# Patient Record
Sex: FEMALE | Race: WHITE | Employment: OTHER | URBAN - METROPOLITAN AREA
[De-identification: names, ages, dates, MRNs, and addresses within clinical notes are randomized per-mention and may not be internally consistent; named-entity substitution may affect disease eponyms.]

---

## 2017-10-25 ENCOUNTER — OP HISTORICAL/CONVERTED ENCOUNTER (OUTPATIENT)
Dept: OTHER | Age: 72
End: 2017-10-25

## 2017-12-11 ENCOUNTER — OP HISTORICAL/CONVERTED ENCOUNTER (OUTPATIENT)
Dept: OTHER | Age: 72
End: 2017-12-11

## 2020-08-19 ENCOUNTER — ED HISTORICAL/CONVERTED ENCOUNTER (OUTPATIENT)
Dept: OTHER | Age: 75
End: 2020-08-19

## 2020-08-24 ENCOUNTER — TELEPHONE (OUTPATIENT)
Dept: PRIMARY CARE CLINIC | Age: 75
End: 2020-08-24

## 2020-08-25 NOTE — TELEPHONE ENCOUNTER
I denied it because she have refill at the [facility]. Ivania Víctor filled it on 07/01/2020 90day supply with 1 refill.

## 2020-10-13 ENCOUNTER — TELEPHONE (OUTPATIENT)
Dept: PRIMARY CARE CLINIC | Age: 75
End: 2020-10-13

## 2020-10-13 NOTE — TELEPHONE ENCOUNTER
Patient called stating the pharmacy is holding her medication til tomorrow, she wants to talk to Judi.

## 2020-10-14 ENCOUNTER — TELEPHONE (OUTPATIENT)
Dept: PRIMARY CARE CLINIC | Age: 75
End: 2020-10-14

## 2020-10-15 RX ORDER — LOSARTAN POTASSIUM AND HYDROCHLOROTHIAZIDE 12.5; 1 MG/1; MG/1
TABLET ORAL
Qty: 30 TAB | Refills: 0 | Status: SHIPPED | OUTPATIENT
Start: 2020-10-15 | End: 2021-03-16 | Stop reason: SDUPTHER

## 2020-10-15 NOTE — TELEPHONE ENCOUNTER
I called pt house # but its the wrong # then I call the cell and it didn't do anything. I will call pt back.

## 2020-10-15 NOTE — TELEPHONE ENCOUNTER
OK. I have called the pt her home phone is not the right number and her cell phone is not doing anything.  LE

## 2021-03-16 DIAGNOSIS — G25.81 RESTLESS LEG: ICD-10-CM

## 2021-03-16 DIAGNOSIS — E55.9 VITAMIN D DEFICIENCY: ICD-10-CM

## 2021-03-16 DIAGNOSIS — E78.2 MIXED HYPERLIPIDEMIA: ICD-10-CM

## 2021-03-16 DIAGNOSIS — I10 ESSENTIAL HYPERTENSION: Primary | ICD-10-CM

## 2021-03-16 DIAGNOSIS — K21.9 GERD WITHOUT ESOPHAGITIS: ICD-10-CM

## 2021-03-16 RX ORDER — EZETIMIBE 10 MG/1
10 TABLET ORAL DAILY
Qty: 30 TAB | Refills: 0 | Status: SHIPPED | OUTPATIENT
Start: 2021-03-16 | End: 2021-04-27 | Stop reason: SDUPTHER

## 2021-03-16 RX ORDER — PHENOL/SODIUM PHENOLATE
20 AEROSOL, SPRAY (ML) MUCOUS MEMBRANE DAILY
Qty: 30 TAB | Refills: 0 | Status: SHIPPED | OUTPATIENT
Start: 2021-03-16 | End: 2021-04-27 | Stop reason: SDUPTHER

## 2021-03-16 RX ORDER — MELATONIN
1000 DAILY
Qty: 30 TAB | Refills: 0 | Status: SHIPPED | OUTPATIENT
Start: 2021-03-16 | End: 2021-04-27 | Stop reason: SDUPTHER

## 2021-03-16 RX ORDER — ROPINIROLE 2 MG/1
2 TABLET, FILM COATED ORAL
Qty: 30 TAB | Refills: 0 | Status: SHIPPED | OUTPATIENT
Start: 2021-03-16 | End: 2021-12-02 | Stop reason: SDUPTHER

## 2021-03-16 RX ORDER — LOSARTAN POTASSIUM AND HYDROCHLOROTHIAZIDE 12.5; 1 MG/1; MG/1
1 TABLET ORAL DAILY
Qty: 30 TAB | Refills: 0 | Status: SHIPPED | OUTPATIENT
Start: 2021-03-16 | End: 2021-04-27 | Stop reason: SDUPTHER

## 2021-03-16 NOTE — PROGRESS NOTES
Here with her son today for his appointment. Sending in 30 days of her medication, agrees to make an appointment in next 30 days for medication refill as it has been over 6 months since her last visit.

## 2021-04-10 DIAGNOSIS — E78.2 MIXED HYPERLIPIDEMIA: ICD-10-CM

## 2021-04-10 DIAGNOSIS — G25.81 RESTLESS LEG: ICD-10-CM

## 2021-04-10 DIAGNOSIS — K21.9 GERD WITHOUT ESOPHAGITIS: ICD-10-CM

## 2021-04-10 DIAGNOSIS — E55.9 VITAMIN D DEFICIENCY: ICD-10-CM

## 2021-04-11 RX ORDER — OMEPRAZOLE 20 MG/1
CAPSULE, DELAYED RELEASE ORAL
Qty: 30 CAP | OUTPATIENT
Start: 2021-04-11

## 2021-04-11 RX ORDER — ROPINIROLE 2 MG/1
TABLET, FILM COATED ORAL
Qty: 30 TAB | Refills: 0 | OUTPATIENT
Start: 2021-04-11

## 2021-04-11 RX ORDER — EZETIMIBE 10 MG/1
TABLET ORAL
Qty: 30 TAB | Refills: 0 | OUTPATIENT
Start: 2021-04-11

## 2021-04-11 RX ORDER — MELATONIN
Qty: 30 TAB | Refills: 0 | OUTPATIENT
Start: 2021-04-11

## 2021-04-27 ENCOUNTER — OFFICE VISIT (OUTPATIENT)
Dept: PRIMARY CARE CLINIC | Age: 76
End: 2021-04-27
Payer: MEDICARE

## 2021-04-27 VITALS
SYSTOLIC BLOOD PRESSURE: 120 MMHG | DIASTOLIC BLOOD PRESSURE: 75 MMHG | OXYGEN SATURATION: 97 % | WEIGHT: 156.4 LBS | RESPIRATION RATE: 18 BRPM | HEART RATE: 78 BPM | BODY MASS INDEX: 28.78 KG/M2 | HEIGHT: 62 IN | TEMPERATURE: 98.2 F

## 2021-04-27 DIAGNOSIS — S82.892G CLOSED FRACTURE OF LEFT ANKLE WITH DELAYED HEALING, SUBSEQUENT ENCOUNTER: ICD-10-CM

## 2021-04-27 DIAGNOSIS — E55.9 VITAMIN D DEFICIENCY: ICD-10-CM

## 2021-04-27 DIAGNOSIS — R42 VERTIGO: ICD-10-CM

## 2021-04-27 DIAGNOSIS — Z91.81 AT HIGH RISK FOR FALLS: ICD-10-CM

## 2021-04-27 DIAGNOSIS — Z11.59 NEED FOR HEPATITIS C SCREENING TEST: ICD-10-CM

## 2021-04-27 DIAGNOSIS — M15.9 PRIMARY OSTEOARTHRITIS INVOLVING MULTIPLE JOINTS: ICD-10-CM

## 2021-04-27 DIAGNOSIS — Z78.0 POSTMENOPAUSAL STATE: ICD-10-CM

## 2021-04-27 DIAGNOSIS — E78.2 MIXED HYPERLIPIDEMIA: ICD-10-CM

## 2021-04-27 DIAGNOSIS — I10 ESSENTIAL HYPERTENSION: ICD-10-CM

## 2021-04-27 DIAGNOSIS — Z12.31 ENCOUNTER FOR SCREENING MAMMOGRAM FOR MALIGNANT NEOPLASM OF BREAST: ICD-10-CM

## 2021-04-27 DIAGNOSIS — K21.9 GERD WITHOUT ESOPHAGITIS: ICD-10-CM

## 2021-04-27 DIAGNOSIS — Z71.89 ACP (ADVANCE CARE PLANNING): ICD-10-CM

## 2021-04-27 DIAGNOSIS — Z00.00 MEDICARE ANNUAL WELLNESS VISIT, SUBSEQUENT: Primary | ICD-10-CM

## 2021-04-27 PROCEDURE — 1101F PT FALLS ASSESS-DOCD LE1/YR: CPT | Performed by: NURSE PRACTITIONER

## 2021-04-27 PROCEDURE — G8752 SYS BP LESS 140: HCPCS | Performed by: NURSE PRACTITIONER

## 2021-04-27 PROCEDURE — G8400 PT W/DXA NO RESULTS DOC: HCPCS | Performed by: NURSE PRACTITIONER

## 2021-04-27 PROCEDURE — G8419 CALC BMI OUT NRM PARAM NOF/U: HCPCS | Performed by: NURSE PRACTITIONER

## 2021-04-27 PROCEDURE — 3017F COLORECTAL CA SCREEN DOC REV: CPT | Performed by: NURSE PRACTITIONER

## 2021-04-27 PROCEDURE — G0439 PPPS, SUBSEQ VISIT: HCPCS | Performed by: NURSE PRACTITIONER

## 2021-04-27 PROCEDURE — G8536 NO DOC ELDER MAL SCRN: HCPCS | Performed by: NURSE PRACTITIONER

## 2021-04-27 PROCEDURE — G8754 DIAS BP LESS 90: HCPCS | Performed by: NURSE PRACTITIONER

## 2021-04-27 PROCEDURE — 1090F PRES/ABSN URINE INCON ASSESS: CPT | Performed by: NURSE PRACTITIONER

## 2021-04-27 PROCEDURE — 99214 OFFICE O/P EST MOD 30 MIN: CPT | Performed by: NURSE PRACTITIONER

## 2021-04-27 PROCEDURE — G8427 DOCREV CUR MEDS BY ELIG CLIN: HCPCS | Performed by: NURSE PRACTITIONER

## 2021-04-27 PROCEDURE — G8432 DEP SCR NOT DOC, RNG: HCPCS | Performed by: NURSE PRACTITIONER

## 2021-04-27 PROCEDURE — G9899 SCRN MAM PERF RSLTS DOC: HCPCS | Performed by: NURSE PRACTITIONER

## 2021-04-27 RX ORDER — PHENOL/SODIUM PHENOLATE
20 AEROSOL, SPRAY (ML) MUCOUS MEMBRANE DAILY
Qty: 90 TAB | Refills: 1 | Status: SHIPPED | OUTPATIENT
Start: 2021-04-27 | End: 2021-12-02 | Stop reason: SDUPTHER

## 2021-04-27 RX ORDER — LOSARTAN POTASSIUM AND HYDROCHLOROTHIAZIDE 12.5; 1 MG/1; MG/1
1 TABLET ORAL DAILY
Qty: 90 TAB | Refills: 1 | Status: SHIPPED | OUTPATIENT
Start: 2021-04-27 | End: 2021-12-02 | Stop reason: SDUPTHER

## 2021-04-27 RX ORDER — ESTRADIOL 0.1 MG/G
CREAM VAGINAL
COMMUNITY
Start: 2021-03-15 | End: 2021-12-02 | Stop reason: SDUPTHER

## 2021-04-27 RX ORDER — MELOXICAM 7.5 MG/1
7.5 TABLET ORAL DAILY
Qty: 90 TAB | Refills: 1 | Status: SHIPPED | OUTPATIENT
Start: 2021-04-27 | End: 2022-05-23

## 2021-04-27 RX ORDER — EZETIMIBE 10 MG/1
10 TABLET ORAL DAILY
Qty: 90 TAB | Refills: 1 | Status: SHIPPED | OUTPATIENT
Start: 2021-04-27 | End: 2021-12-02 | Stop reason: SDUPTHER

## 2021-04-27 RX ORDER — MECLIZINE HYDROCHLORIDE 25 MG/1
25 TABLET ORAL
Qty: 30 TAB | Refills: 1 | Status: SHIPPED | OUTPATIENT
Start: 2021-04-27 | End: 2021-05-24

## 2021-04-27 RX ORDER — MELATONIN
1000 DAILY
Qty: 90 TAB | Refills: 1 | Status: SHIPPED | OUTPATIENT
Start: 2021-04-27 | End: 2021-12-02 | Stop reason: SDUPTHER

## 2021-04-27 NOTE — PATIENT INSTRUCTIONS
Medicare Wellness Visit, Female     The best way to live healthy is to have a lifestyle where you eat a well-balanced diet, exercise regularly, limit alcohol use, and quit all forms of tobacco/nicotine, if applicable. Regular preventive services are another way to keep healthy. Preventive services (vaccines, screening tests, monitoring & exams) can help personalize your care plan, which helps you manage your own care. Screening tests can find health problems at the earliest stages, when they are easiest to treat. [facility] follows the current, evidence-based guidelines published by the [facility] Spann ([facility]) when recommending preventive services for our patients. Because we follow these guidelines, sometimes recommendations change over time as research supports it. (For example, mammograms used to be recommended annually. Even though Medicare will still pay for an annual mammogram, the newer guidelines recommend a mammogram every two years for women of average risk). Of course, you and your doctor may decide to screen more often for some diseases, based on your risk and your co-morbidities (chronic disease you are already diagnosed with). Preventive services for you include:  - Medicare offers their members a free annual wellness visit, which is time for you and your primary care provider to discuss and plan for your preventive service needs. Take advantage of this benefit every year!  -All adults over the age of 72 should receive the recommended pneumonia vaccines. Current USPSTF guidelines recommend a series of two vaccines for the best pneumonia protection.   -All adults should have a flu vaccine yearly and a tetanus vaccine every 10 years.   -All adults age 48 and older should receive the shingles vaccines (series of two vaccines).       -All adults age 38-68 who are overweight should have a diabetes screening test once every three years.   -All adults born between [birth year] and 1964 should be screened once for Hepatitis C.  -Other screening tests and preventive services for persons with diabetes include: an eye exam to screen for diabetic retinopathy, a kidney function test, a foot exam, and stricter control over your cholesterol.   -Cardiovascular screening for adults with routine risk involves an electrocardiogram (ECG) at intervals determined by your doctor.   -Colorectal cancer screenings should be done for adults age 54-65 with no increased risk factors for colorectal cancer. There are a number of acceptable methods of screening for this type of cancer. Each test has its own benefits and drawbacks. Discuss with your doctor what is most appropriate for you during your annual wellness visit. The different tests include: colonoscopy (considered the best screening method), a fecal occult blood test, a fecal DNA test, and sigmoidoscopy.    -A bone mass density test is recommended when a woman turns 65 to screen for osteoporosis. This test is only recommended one time, as a screening. Some providers will use this same test as a disease monitoring tool if you already have osteoporosis. -Breast cancer screenings are recommended every other year for women of normal risk, age 54-69.  -Cervical cancer screenings for women over age 72 are only recommended with certain risk factors.      Here is a list of your current Health Maintenance items (your personalized list of preventive services) with a due date:  Health Maintenance Due   Topic Date Due    Hepatitis C Test  Never done    DTaP/Tdap/Td  (1 - Tdap) Never done    Cholesterol Test   Never done    Shingles Vaccine (1 of 2) Never done    Colorectal Screening  Never done    Mammogram  Never done    Bone Mineral Density   Never done              Preventing Falls: Care Instructions  Your Care Instructions    Getting around your home safely can be a challenge if you have injuries or health problems that make it easy for you to fall. Loose rugs and furniture in walkways are among the dangers for many older people who have problems walking or who have poor eyesight. People who have conditions such as arthritis, osteoporosis, or dementia also have to be careful not to fall. You can make your home safer with a few simple measures. Follow-up care is a key part of your treatment and safety. Be sure to make and go to all appointments, and call your doctor if you are having problems. It's also a good idea to know your test results and keep a list of the medicines you take. How can you care for yourself at home? Taking care of yourself  · You may get dizzy if you do not drink enough water. To prevent dehydration, drink plenty of fluids, enough so that your urine is light yellow or clear like water. Choose water and other caffeine-free clear liquids. If you have kidney, heart, or liver disease and have to limit fluids, talk with your doctor before you increase the amount of fluids you drink. · Exercise regularly to improve your strength, muscle tone, and balance. Walk if you can. Swimming may be a good choice if you cannot walk easily. · Have your vision and hearing checked each year or any time you notice a change. If you have trouble seeing and hearing, you might not be able to avoid objects and could lose your balance. · Know the side effects of the medicines you take. Ask your doctor or pharmacist whether the medicines you take can affect your balance. Sleeping pills or sedatives can affect your balance. · Limit the amount of alcohol you drink. Alcohol can impair your balance and other senses. · Ask your doctor whether calluses or corns on your feet need to be removed. If you wear loose-fitting shoes because of calluses or corns, you can lose your balance and fall. · Talk to your doctor if you have numbness in your feet. Preventing falls at home  · Remove raised doorway thresholds, throw rugs, and clutter.  Repair loose carpet or raised areas in the floor. · Move furniture and electrical cords to keep them out of walking paths. · Use nonskid floor wax, and wipe up spills right away, especially on ceramic tile floors. · If you use a walker or cane, put rubber tips on it. If you use crutches, clean the bottoms of them regularly with an abrasive pad, such as steel wool. · Keep your house well lit, especially Rufina Nika, and outside walkways. Use night-lights in areas such as hallways and bathrooms. Add extra light switches or use remote switches (such as switches that go on or off when you clap your hands) to make it easier to turn lights on if you have to get up during the night. · Install sturdy handrails on stairways. · Move items in your cabinets so that the things you use a lot are on the lower shelves (about waist level). · Keep a cordless phone and a flashlight with new batteries by your bed. If possible, put a phone in each of the main rooms of your house, or carry a cell phone in case you fall and cannot reach a phone. Or, you can wear a device around your neck or wrist. You push a button that sends a signal for help. · Wear low-heeled shoes that fit well and give your feet good support. Use footwear with nonskid soles. Check the heels and soles of your shoes for wear. Repair or replace worn heels or soles. · Do not wear socks without shoes on wood floors. · Walk on the grass when the sidewalks are slippery. If you live in an area that gets snow and ice in the winter, sprinkle salt on slippery steps and sidewalks. Preventing falls in the bath  · Install grab bars and nonskid mats inside and outside your shower or tub and near the toilet and sinks. · Use shower chairs and bath benches. · Use a hand-held shower head that will allow you to sit while showering.   · Get into a tub or shower by putting the weaker leg in first. Get out of a tub or shower with your strong side first.  · Repair loose toilet seats and consider installing a raised toilet seat to make getting on and off the toilet easier. · Keep your bathroom door unlocked while you are in the shower. Where can you learn more? Go to http://www.[employer]aran[employer].com/. Enter [phone] [id number] [phone] in the search box to learn more about \"Preventing Falls: Care Instructions. \"  Current as of: March 16, 2018  Content Version: 11.8  © 7474-4027 [employer]. Care instructions adapted under license by [employer] (which disclaims liability or warranty for this information). If you have questions about a medical condition or this instruction, always ask your healthcare professional. [address] any warranty or liability for your use of this information.

## 2021-04-27 NOTE — PROGRESS NOTES
1. Have you been to the ER, urgent care clinic since your last visit? Hospitalized since your last visit? Yes pt broke her ankle    2. Have you seen or consulted any other health care providers outside of the [facility] since your last visit? Include any pap smears or colon screening. No  Visit Vitals  /75 (BP 1 Location: Right arm, BP Patient Position: At rest, BP Cuff Size: Adult)   Pulse 78   Temp 98.2 °F (36.8 °C) (Temporal)   Resp 18   Ht 5' 2\" (1.575 m)   Wt 156 lb 6.4 oz (70.9 kg)   SpO2 97%   BMI 28.61 kg/m²     Chief Complaint   Patient presents with    Follow Up Chronic Condition     Pt is here for chronic office visit.  Pt is asking for refills on vitamin d, zetia, omeprazole, ropinirole, losartan-htcz

## 2021-04-27 NOTE — PROGRESS NOTES
Gregorio Garcia is a 77 y.o. female presents for Medicare wellness visit. This is a Subsequent Medicare Annual Wellness Visit (AWV), (Performed more than 12 months after effective date of Medicare Part B enrollment and 12 months after last preventive visit.)    I have reviewed the patient's medical history in detail and updated the computerized patient record. History obtained from: the patient. female  77 y.o. WHITE  Depression Risk Factor Screening:     3 most recent PHQ Screens 4/27/2021   Little interest or pleasure in doing things Not at all   Feeling down, depressed, irritable, or hopeless Not at all   Total Score PHQ 2 0          Fall Risk Factor Screening:     Fall Risk Assessment, last 12 mths 4/27/2021   Able to walk? Yes   Fall in past 12 months? 1   Do you feel unsteady? 1   Are you worried about falling 1   Is the gait abnormal? 1   Number of falls in past 12 months 1   Fall with injury? 1       Alcohol Risk Screen    Do you average more than 1 drink per night or more than 7 drinks a week:  No    On any one occasion in the past three months have you have had more than 3 drinks containing alcohol:  No         Functional Ability and Level of Safety:    Hearing: Hearing is good. Activities of Daily Living: The home contains: no safety equipment. Patient does total self care      Ambulation: with no difficulty      Abuse Screen:  Patient is not abused         History and Pertinent Exam   Patient is due for chronic medical conditions and/or has acute concerns in addition to the medicare wellness visit and agrees to do both, understanding there may be a copay for the additional services provided. Other Concerns today:     1. Hypertension: Patients hypertension is well controlled on regimen of losartan- HCTZ. Denies headaches, blurred vision or dizziness. 2. Hyperlipidemia: Mixed hyperlipidemia well controlled on ezetimibe.     3.Acid Reflux: Patients GERD has been well controlled on prilosec. 4. Restless leg syndrome: she uses requip at night for her restless legs with good control over her symptoms. Only uses PRN as restless legs has calmed down. Does not need a refill today. 5. Vitamin D deficiency: She has been using daily vitamin D supplement. 6. OA: Primarily noted in shoulder, knees and back. Uses celebrex currently but would like to try something else. Patient notes that she has been having some dizziness when position changes. Feels like thinks are spinning. This comes and goes. Denies chest pain or palpitations. Health & Diet:   Please describe your diet habits: Regular   Do you get 5 servings of fruits or vegetables daily? yes  Do you exercise regularly? no    Review of Systems   Constitutional: Negative for malaise/fatigue and weight loss. HENT: Negative. Eyes: Negative for blurred vision, double vision and pain. Respiratory: Negative for cough and shortness of breath. Cardiovascular: Negative for chest pain, palpitations, orthopnea and leg swelling. Gastrointestinal: Negative for constipation, heartburn and nausea. Genitourinary: Negative. Musculoskeletal: Positive for falls and joint pain. Negative for myalgias. Skin: Negative for itching and rash. Neurological: Positive for dizziness. Negative for tingling, loss of consciousness, weakness and headaches. Endo/Heme/Allergies: Does not bruise/bleed easily. Psychiatric/Behavioral: Negative for depression. The patient is not nervous/anxious and does not have insomnia. Reviewed PmHx, FmHx, SocHx as well as meds and allergies, updated and dated in the chart.     Patient Active Problem List   Diagnosis Code    Essential hypertension I10    Mixed hyperlipidemia E78.2    Vitamin D deficiency E55.9    GERD without esophagitis K21.9    Restless leg G25.81    At high risk for falls Z91.81     Past Medical History:   Diagnosis Date    Diabetes ([facility] [address]     Hypercholesterolemia     Hypertension       Past Surgical History:   Procedure Laterality Date    HX ORTHOPAEDIC  09/2018    Hip repair      No Known Allergies  Current Outpatient Medications   Medication Sig Dispense Refill    estradioL (ESTRACE) 0.01 % (0.1 mg/gram) vaginal cream insert 1 gram vaginally three times a week      meloxicam (MOBIC) 7.5 mg tablet Take 1 Tab by mouth daily. 90 Tab 1    Omeprazole delayed release (PRILOSEC D/R) 20 mg tablet Take 1 Tab by mouth daily. Indications: gastroesophageal reflux disease 90 Tab 1    losartan-hydroCHLOROthiazide (HYZAAR) 100-12.5 mg per tablet Take 1 Tab by mouth daily. 90 Tab 1    ezetimibe (ZETIA) 10 mg tablet Take 1 Tab by mouth daily. 90 Tab 1    cholecalciferol (VITAMIN D3) (1000 Units /25 mcg) tablet Take 1 Tab by mouth daily. Indications: low vitamin D levels 90 Tab 1    meclizine (ANTIVERT) 25 mg tablet Take 1 Tab by mouth three (3) times daily as needed for Dizziness for up to 10 days. 30 Tab 1    rOPINIRole (REQUIP) 2 mg tablet Take 1 Tab by mouth nightly. 27 Tab 0     Family History   Problem Relation Age of Onset    Diabetes Other     Heart Disease Other     Lung Cancer Other      Social History     Tobacco Use    Smoking status: Never Smoker    Smokeless tobacco: Never Used   Substance Use Topics    Alcohol use: Yes     Comment: occasional          BP Readings from Last 3 Encounters:   04/27/21 120/75      Wt Readings from Last 3 Encounters:   04/27/21 156 lb 6.4 oz (70.9 kg)     Body mass index is 28.61 kg/m². No exam data present  Physical Exam  Constitutional:       Appearance: Normal appearance. HENT:      Head: Normocephalic and atraumatic. Right Ear: Tympanic membrane, ear canal and external ear normal.      Left Ear: Tympanic membrane, ear canal and external ear normal.      Nose: Nose normal.      Mouth/Throat:      Mouth: Mucous membranes are moist.      Pharynx: Oropharynx is clear.    Eyes:      Extraocular Movements: Extraocular movements intact. Right eye: Nystagmus present. Left eye: Nystagmus present. Conjunctiva/sclera: Conjunctivae normal.      Pupils: Pupils are equal, round, and reactive to light. Neck:      Musculoskeletal: Normal range of motion and neck supple. Cardiovascular:      Rate and Rhythm: Normal rate and regular rhythm. Pulses: Normal pulses. Heart sounds: Normal heart sounds. Pulmonary:      Effort: Pulmonary effort is normal.      Breath sounds: Normal breath sounds. Abdominal:      General: Abdomen is flat. Bowel sounds are normal.      Palpations: Abdomen is soft. Musculoskeletal: Normal range of motion. Left ankle: She exhibits swelling. Tenderness. Right lower leg: No edema. Left lower leg: No edema. Skin:     General: Skin is warm and dry. Capillary Refill: Capillary refill takes less than 2 seconds. Neurological:      General: No focal deficit present. Mental Status: She is alert and oriented to person, place, and time. Psychiatric:         Mood and Affect: Mood normal.         Thought Content: Thought content normal.         Judgment: Judgment normal.       Was the patient's timed Up & Go test unsteady or longer than 30 seconds? no    Evaluation of Cognitive Function   Mood/affect:  neutral, happy  Orientation: Person, Place, Time and Situation  Appearance: age appropriate  Family member/caregiver input: None  Clock Test and Mini Cog: Normal   Specialists/Care Team   Ashish Liang has established care with the following healthcare providers:    OBGYN: Dr. Case Officer for routine care    Ortho: Broke ankle in August 2020, following up with ortho.      PREVENTIVE CARE -SCREENINGS      Health Maintenance Topics with due status: Overdue       Topic Date Due    Hepatitis C Screening Never done    DTaP/Tdap/Td series Never done    Lipid Screen Never done    Shingrix Vaccine Age 50> Never done    Colorectal Cancer Screening Combo Never done    Breast Cancer Screen Mammogram Never done    Bone Densitometry (Dexa) Screening Never done     Health Maintenance Topics with due status: Not Due       Topic Last Completion Date    Medicare Yearly Exam 04/27/2021    Flu Vaccine Not Due     Health Maintenance Topics with due status: Completed       Topic Last Completion Date    Pneumococcal 65+ years 10/17/2014    COVID-19 Vaccine 04/21/2021         Colon cancer:  Recommendation: Colonoscopy every 10y or annual FIT test from 50-75 or every 3 year stool DNA based test with consideration of ongoing screening from 76-85. and Up to date or Completed    Lung cancer (LDCT): Recommendation: Yearly LDCT for pts 55-77 w 30-pack year hx and currently smoke or quit <15 yr ago. and Not Indicated    Hepatitis C:  Recommendation: One time screening for all patient's aged 18-79. and Due, ordered    Diabetes:   Recommendation: USPSTF recommends screening ages 38-67 y/o if overweight or obese. Medicare covers screening in those patients who are overweight, obese, have HTN or dyslipiemia, a personal history of gestational diabetes or prior elevated blood sugar, a family hx of DM, or any patient over age 72 and Up to date or Completed    Lipids:   Recommendation: screening for hyperlipidemia every 5 years after age 39 and Due, ordered        PREVENTIVE CARE - FEMALE SCREENINGS     Cervical cancer: Recommendation: Every 3 yr from 21-29 and every 5 yr from 33-67, with Pap and HPV testing. and Not Indicated    Breast cancer: Recommendation:  USPSTF recommends screening mammography every 2 yr from 54-69. The decision to start screening mammography in women prior to age 48 years should be an individual one. Women who place a higher value on the potential benefit than the potential harms may choose to begin biennial screening between the ages of 36 and 52 years.  Medicare covers annual screening mammography, USPSTF also recommends women with a personal or family history of breast, ovarian, tubal, or peritoneal cancer or who have an ancestry ([address]) associated with breast cancer susceptibility 1 and 2 (BRCA1/2) gene mutations with an appropriate brief familial risk assessment tool. Women with a positive result on the risk assessment tool should receive genetic counseling and, if indicated after counseling, genetic testing and mammogram is due, ordered    Osteoporosis: Recommendation: Screen all women at 72, earlier if elevated risk and Due, ordered    AAA:   Recommendation: One-time screening if family history of AAA and Not Indicated      IMMUNIZATIONS     Immunization History   Administered Date(s) Administered    COVID-19, PFIZER, MRNA, LNP-S, PF, 30MCG/0.3ML DOSE 03/30/2021, 04/21/2021    Pneumococcal Conjugate (PCV-13) 01/14/2014    Pneumococcal Polysaccharide (PPSV-23) 10/17/2014       Pneumovax:   Recommendation: PPSV23 once for all >65 and high risk <65  and Up to date or Completed    Prevnar:   Recommendation: PCV13 only if >65 and immunocompromised or residing in a nursing home, or in areas of low childhood Pneumococcal vaccination and Up to date or Completed    Influenza:   Recommendation: Vaccination annually, high dose if 65 or older and Up to date or Completed    Shingrix:  Recommendation: Vaccination 2 shots 2-6 months apart for all age >47 and Up to date or Completed    TDaP:    Recommendation: Vaccination Booster with TDaP every 10 yr. Discussion of Advance Directive   Discussed with Funmi Bardales. Lex Anil her ability to prepare and advance directive in the case that an injury or illness causes her to be unable to make health care decisions. Date of ACP Conversation: 04/27/21  Persons included in Conversation:  patient  Length of ACP Conversation in minutes:  <16 minutes (Non-Billable)    Authorized Decision Maker (if patient is incapable of making informed decisions):    This person is:   Named in Advance Directive or Healthcare Power of             For Patients with Decision Making Capacity:   Values/Goals: Exploration of values, goals, and preferences if recovery is not expected, even with continued medical treatment in the event of:  Imminent death  Severe, permanent brain injury    Conversation Outcomes / Follow-Up Plan:   ACP incomplete - refer to ACP Clinical Specialist    Assessment/Plan   V70.0,     Diagnoses and all orders for this visit:    1. Medicare annual wellness visit, subsequent    2. ACP (advance care planning)  -     REFERRAL TO ACP CLINICAL SPECIALIST    3. Vitamin D deficiency  Comments:  check vitamin D level. Orders:  -     cholecalciferol (VITAMIN D3) (1000 Units /25 mcg) tablet; Take 1 Tab by mouth daily. Indications: low vitamin D levels  -     VITAMIN D, 25 HYDROXY    4. Primary osteoarthritis involving multiple joints  Comments:  switch from celebrex to meloxicam.     Orders:  -     meloxicam (MOBIC) 7.5 mg tablet; Take 1 Tab by mouth daily. 5. GERD without esophagitis  -     Omeprazole delayed release (PRILOSEC D/R) 20 mg tablet; Take 1 Tab by mouth daily. Indications: gastroesophageal reflux disease    6. Essential hypertension  Comments:  well controlled on current medication  Orders:  -     losartan-hydroCHLOROthiazide (HYZAAR) 100-12.5 mg per tablet; Take 1 Tab by mouth daily.  -     METABOLIC PANEL, COMPREHENSIVE  -     CBC WITH AUTOMATED DIFF    7. Mixed hyperlipidemia  Comments:  stable on zetia  Orders:  -     ezetimibe (ZETIA) 10 mg tablet; Take 1 Tab by mouth daily.  -     LIPID PANEL    8. Encounter for screening mammogram for malignant neoplasm of breast  -     LAZARA MAMMO BI SCREENING INCL CAD; Future    9. Postmenopausal state  -     DEXA BONE DENSITY STUDY AXIAL; Future    10. Need for hepatitis C screening test  -     HEPATITIS C AB    11. Vertigo  Comments:  start on meclizine. Stay hydrated. Get up and change positions slowly to avoid falls. Orders:  -     meclizine (ANTIVERT) 25 mg tablet;  Take 1 Tab by mouth three (3) times daily as needed for Dizziness for up to 10 days. 12. At high risk for falls  Comments:  Discussed slower position movements to help prevent falls. Watch for lose rugs/objects in floor. 13. Closed fracture of left ankle with delayed healing, subsequent encounter  Comments:  continue with pain from ankle fracture in August 2020. Plans to call and follow up with ortho at [facility] in [facility].                Anamaria Odonnell NP

## 2021-04-28 ENCOUNTER — TELEPHONE (OUTPATIENT)
Dept: CASE MANAGEMENT | Age: 76
End: 2021-04-28

## 2021-04-28 LAB
25(OH)D3+25(OH)D2 SERPL-MCNC: 24.5 NG/ML (ref 30–100)
ALBUMIN SERPL-MCNC: 4.2 G/DL (ref 3.7–4.7)
ALBUMIN/GLOB SERPL: 1.7 {RATIO} (ref 1.2–2.2)
ALP SERPL-CCNC: 156 IU/L (ref 39–117)
ALT SERPL-CCNC: 34 IU/L (ref 0–32)
AST SERPL-CCNC: 30 IU/L (ref 0–40)
BASOPHILS # BLD AUTO: 0 X10E3/UL (ref 0–0.2)
BASOPHILS NFR BLD AUTO: 0 %
BILIRUB SERPL-MCNC: 0.3 MG/DL (ref 0–1.2)
BUN SERPL-MCNC: 16 MG/DL (ref 8–27)
BUN/CREAT SERPL: 24 (ref 12–28)
CALCIUM SERPL-MCNC: 9.5 MG/DL (ref 8.7–10.3)
CHLORIDE SERPL-SCNC: 104 MMOL/L (ref 96–106)
CHOLEST SERPL-MCNC: 191 MG/DL (ref 100–199)
CO2 SERPL-SCNC: 24 MMOL/L (ref 20–29)
CREAT SERPL-MCNC: 0.66 MG/DL (ref 0.57–1)
EOSINOPHIL # BLD AUTO: 0.3 X10E3/UL (ref 0–0.4)
EOSINOPHIL NFR BLD AUTO: 4 %
ERYTHROCYTE [DISTWIDTH] IN BLOOD BY AUTOMATED COUNT: 14.2 % (ref 11.7–15.4)
GLOBULIN SER CALC-MCNC: 2.5 G/DL (ref 1.5–4.5)
GLUCOSE SERPL-MCNC: 98 MG/DL (ref 65–99)
HCT VFR BLD AUTO: 36.1 % (ref 34–46.6)
HCV AB S/CO SERPL IA: <0.1 S/CO RATIO (ref 0–0.9)
HDLC SERPL-MCNC: 53 MG/DL
HGB BLD-MCNC: 12.3 G/DL (ref 11.1–15.9)
IMM GRANULOCYTES # BLD AUTO: 0 X10E3/UL (ref 0–0.1)
IMM GRANULOCYTES NFR BLD AUTO: 0 %
LDLC SERPL CALC-MCNC: 115 MG/DL (ref 0–99)
LYMPHOCYTES # BLD AUTO: 2.3 X10E3/UL (ref 0.7–3.1)
LYMPHOCYTES NFR BLD AUTO: 33 %
MCH RBC QN AUTO: 29.6 PG (ref 26.6–33)
MCHC RBC AUTO-ENTMCNC: 34.1 G/DL (ref 31.5–35.7)
MCV RBC AUTO: 87 FL (ref 79–97)
MONOCYTES # BLD AUTO: 0.8 X10E3/UL (ref 0.1–0.9)
MONOCYTES NFR BLD AUTO: 11 %
NEUTROPHILS # BLD AUTO: 3.6 X10E3/UL (ref 1.4–7)
NEUTROPHILS NFR BLD AUTO: 52 %
PLATELET # BLD AUTO: 356 X10E3/UL (ref 150–450)
POTASSIUM SERPL-SCNC: 4.3 MMOL/L (ref 3.5–5.2)
PROT SERPL-MCNC: 6.7 G/DL (ref 6–8.5)
RBC # BLD AUTO: 4.15 X10E6/UL (ref 3.77–5.28)
SODIUM SERPL-SCNC: 143 MMOL/L (ref 134–144)
TRIGL SERPL-MCNC: 127 MG/DL (ref 0–149)
VLDLC SERPL CALC-MCNC: 23 MG/DL (ref 5–40)
WBC # BLD AUTO: 7 X10E3/UL (ref 3.4–10.8)

## 2021-04-28 NOTE — ACP (ADVANCE CARE PLANNING)
Ambulatory ACP Specialist Patient Outreach    Date:  4/28/2021  ACP Specialist:  Abeba Hopson LCSW      Outreach call to patient in follow-up to ACP Specialist referral from:  [] PCP  [x] Provider   [] Ambulatory Care Management [] Other for Reason:    [x] Continued Conversation for ACP decision making / Goals of Care  [] Code Status Discussion  [] Completion of Adv Directive  [] Completion of Portable DNR order  [] Other (Specify)    Date Referral Received:4/28/2021    Today's Outreach:  [x] First   [] Second  [] Third                               Third outreach made by [x]  phone  [] email []   [employer]     Intervention:  [] Spoke with Patient  [x] Left VM requesting return call      Outcome:Left message requesting a return call       Next Step:   [] ACP scheduled conversation  [x] Outreach again in 7-10 business days               [x] Email / Mail [address]  [] Email / Mail Advance Directive            [] Close Referral. Routing closure to referring provider/staff and to ACP Specialist .      Thank you for this referral.  Abeba Hopson LCSW

## 2021-04-28 NOTE — TELEPHONE ENCOUNTER
Ambulatory ACP Specialist Patient Outreach    Date:  4/28/2021  ACP Specialist:  Krish Keita LCSW      Outreach call to patient in follow-up to ACP Specialist referral from:  [] PCP  [x] Provider   [] Ambulatory Care Management [] Other for Reason:    [x] Continued Conversation for ACP decision making / Goals of Care  [] Code Status Discussion  [] Completion of Adv Directive  [] Completion of Portable DNR order  [] Other (Specify)    Date Referral Received:4/28/2021    Today's Outreach:  [x] First   [] Second  [] Third                               Third outreach made by [x]  phone  [] email []   [email or web address]     Intervention:  [] Spoke with Patient  [x] Left VM requesting return call      Outcome:Left message requesting a return call       Next Step:   [] ACP scheduled conversation  [x] Outreach again in 7-10 business days               [x] Email / Mail [address]  [] Email / Mail Advance Directive            [] Close Referral. Routing closure to referring provider/staff and to ACP Specialist .      Thank you for this referral.  Krish Keita LCSW

## 2021-04-28 NOTE — PROGRESS NOTES
Lab work shows that vitamin D is a little low so make sure to take the supplement daily. 2000 units a day would be ideal.     Otherwise labs look great and we will see her back in 6 months.

## 2021-05-21 ENCOUNTER — DOCUMENTATION ONLY (OUTPATIENT)
Dept: CASE MANAGEMENT | Age: 76
End: 2021-05-21

## 2021-05-21 DIAGNOSIS — R42 VERTIGO: ICD-10-CM

## 2021-05-21 NOTE — ACP (ADVANCE CARE PLANNING)
Advance Care Planning   Ambulatory ACP Specialist Patient Outreach    Date:  5/21/2021    ACP Specialist:  Vlado Beckwith LCSW    Outreach call to patient in follow-up to ACP Specialist referral from:    [] PCP  [x] Provider   [] Ambulatory Care Management [] Other     For:                  [x] Continued Conversation for ACP decision making / Goals of Care             [] Code Status Discussion             [] Completion of Adv Directive             [] Completion of Portable DNR order             [] Other (Specify)    Date Referral Received:4/28/2021    Today's Outreach:  [] First   [x] Second  [] Third                                           Third outreach made by []  phone  [] email []   [employer]     Intervention:  [] Spoke with Patient   [x] Left VM requesting return call      Outcome:Left message inquiring if she received ACP materials via email sent on 4/28/2021. Requested a return call. Next Step:   [] ACP scheduled conversation  [x] Outreach again in one week               [] Email / Mail ACP Info Sheets  [] Email / Mail Advance Directive   []  Closing referral.  Routing closure to referring provider/staff and to ACP Specialist .      Thank you for this referral.  Valdo Beckwith LCSW

## 2021-05-24 RX ORDER — MECLIZINE HYDROCHLORIDE 25 MG/1
TABLET ORAL
Qty: 30 TABLET | Refills: 1 | Status: SHIPPED | OUTPATIENT
Start: 2021-05-24

## 2021-06-11 ENCOUNTER — DOCUMENTATION ONLY (OUTPATIENT)
Dept: CASE MANAGEMENT | Age: 76
End: 2021-06-11

## 2021-06-11 NOTE — ACP (ADVANCE CARE PLANNING)
Advance Care Planning   Ambulatory ACP Specialist Patient Outreach    Date:  6/11/2021    ACP Specialist:  Abeba Hopson LCSW    Outreach call to patient in follow-up to ACP Specialist referral from:    [] PCP  [x] Provider   [] Ambulatory Care Management [] Other     For:                  [x] Continued Conversation for ACP decision making / Goals of Care             [] Code Status Discussion             [] Completion of Adv Directive             [] Completion of Portable DNR order             [] Other (Specify)    Date Referral Received:4/28/2021    Today's Outreach:  [] First   [] Second  [x] Third                                           Third outreach made by []  phone  [x] email []   [employer]     Intervention:  [] Spoke with Patient   [] Left VM requesting return call      Outcome: After no response from 5/27 voice mail to pt, I sent closing letter via email but will be available upon request to have an ACP conversation. Next Step:   [] ACP scheduled conversation  [] Outreach again in one week               [] Email / Mail ACP Info Sheets  [] Email / Mail Advance Directive   [x]  Closing referral.  Routing closure to referring provider/staff and to ACP Specialist .      Thank you for this referral.  Abeba Hopson LCSW

## 2021-07-01 ENCOUNTER — TRANSCRIBE ORDER (OUTPATIENT)
Dept: SCHEDULING | Age: 76
End: 2021-07-01

## 2021-07-01 DIAGNOSIS — M54.16 RADICULOPATHY, LUMBAR REGION: Primary | ICD-10-CM

## 2021-07-01 DIAGNOSIS — M51.36 DEGENERATION, INTERVERTEBRAL DISC, LUMBAR: ICD-10-CM

## 2021-07-23 ENCOUNTER — HOSPITAL ENCOUNTER (OUTPATIENT)
Dept: CT IMAGING | Age: 76
Discharge: HOME | End: 2021-07-23
Payer: MEDICARE

## 2021-07-23 DIAGNOSIS — M54.16 RADICULOPATHY, LUMBAR REGION: ICD-10-CM

## 2021-07-23 DIAGNOSIS — M51.36 DEGENERATION, INTERVERTEBRAL DISC, LUMBAR: ICD-10-CM

## 2021-07-23 PROCEDURE — 72131 CT LUMBAR SPINE W/O DYE: CPT

## 2021-12-02 ENCOUNTER — OFFICE VISIT (OUTPATIENT)
Dept: PRIMARY CARE CLINIC | Age: 76
End: 2021-12-02
Payer: MEDICARE

## 2021-12-02 VITALS
BODY MASS INDEX: 29.44 KG/M2 | SYSTOLIC BLOOD PRESSURE: 169 MMHG | HEIGHT: 62 IN | OXYGEN SATURATION: 98 % | HEART RATE: 74 BPM | WEIGHT: 160 LBS | TEMPERATURE: 98.3 F | DIASTOLIC BLOOD PRESSURE: 99 MMHG | RESPIRATION RATE: 18 BRPM

## 2021-12-02 DIAGNOSIS — G25.81 RESTLESS LEG: ICD-10-CM

## 2021-12-02 DIAGNOSIS — I10 ESSENTIAL HYPERTENSION: ICD-10-CM

## 2021-12-02 DIAGNOSIS — E55.9 VITAMIN D DEFICIENCY: ICD-10-CM

## 2021-12-02 DIAGNOSIS — Z91.81 AT HIGH RISK FOR FALLS: ICD-10-CM

## 2021-12-02 DIAGNOSIS — F22 PARANOID (HCC): Chronic | ICD-10-CM

## 2021-12-02 DIAGNOSIS — E78.2 MIXED HYPERLIPIDEMIA: ICD-10-CM

## 2021-12-02 DIAGNOSIS — K21.9 GERD WITHOUT ESOPHAGITIS: ICD-10-CM

## 2021-12-02 DIAGNOSIS — Z23 ENCOUNTER FOR IMMUNIZATION: Primary | ICD-10-CM

## 2021-12-02 DIAGNOSIS — N89.8 VAGINAL DRYNESS: ICD-10-CM

## 2021-12-02 PROCEDURE — 90715 TDAP VACCINE 7 YRS/> IM: CPT

## 2021-12-02 PROCEDURE — 99213 OFFICE O/P EST LOW 20 MIN: CPT

## 2021-12-02 PROCEDURE — 90694 VACC AIIV4 NO PRSRV 0.5ML IM: CPT

## 2021-12-02 PROCEDURE — G0008 ADMIN INFLUENZA VIRUS VAC: HCPCS

## 2021-12-02 PROCEDURE — 90472 IMMUNIZATION ADMIN EACH ADD: CPT

## 2021-12-02 RX ORDER — EZETIMIBE 10 MG/1
10 TABLET ORAL DAILY
Qty: 90 TABLET | Refills: 1 | Status: SHIPPED | OUTPATIENT
Start: 2021-12-02 | End: 2022-05-23 | Stop reason: SDUPTHER

## 2021-12-02 RX ORDER — MELATONIN
1000 DAILY
Qty: 90 TABLET | Refills: 1 | Status: SHIPPED | OUTPATIENT
Start: 2021-12-02 | End: 2022-05-23 | Stop reason: SDUPTHER

## 2021-12-02 RX ORDER — ESTRADIOL 0.1 MG/G
CREAM VAGINAL
Qty: 1 G | Refills: 1 | Status: SHIPPED | OUTPATIENT
Start: 2021-12-02 | End: 2022-05-23 | Stop reason: SDUPTHER

## 2021-12-02 RX ORDER — PHENOL/SODIUM PHENOLATE
20 AEROSOL, SPRAY (ML) MUCOUS MEMBRANE DAILY
Qty: 90 TABLET | Refills: 1 | Status: SHIPPED | OUTPATIENT
Start: 2021-12-02 | End: 2022-05-23 | Stop reason: SDUPTHER

## 2021-12-02 RX ORDER — ROPINIROLE 2 MG/1
2 TABLET, FILM COATED ORAL
Qty: 30 TABLET | Refills: 0 | Status: SHIPPED | OUTPATIENT
Start: 2021-12-02 | End: 2022-05-23 | Stop reason: SDUPTHER

## 2021-12-02 RX ORDER — LOSARTAN POTASSIUM AND HYDROCHLOROTHIAZIDE 12.5; 1 MG/1; MG/1
1 TABLET ORAL DAILY
Qty: 90 TABLET | Refills: 1 | Status: SHIPPED | OUTPATIENT
Start: 2021-12-02 | End: 2021-12-30

## 2021-12-02 NOTE — PATIENT INSTRUCTIONS
Learning About Anxiety Disorders  What are anxiety disorders? Anxiety disorders are a type of medical problem. They cause severe anxiety. When you feel anxious, you feel that something bad is about to happen. This feeling interferes with your life. These disorders include:  · Generalized anxiety disorder. You feel worried and stressed about many everyday events and activities. This goes on for several months and disrupts your life on most days. · Panic disorder. You have repeated panic attacks. A panic attack is a sudden, intense fear or anxiety. It may make you feel short of breath. Your heart may pound. · Social anxiety disorder. You feel very anxious about what you will say or do in front of people. For example, you may be scared to talk or eat in public. This problem affects your daily life. · Phobias. You are very scared of a specific object, situation, or activity. For example, you may fear spiders, high places, or small spaces. What are the symptoms? Generalized anxiety disorder  Symptoms may include:  · Feeling worried and stressed about many things almost every day. · Feeling tired or irritable. You may have a hard time concentrating. · Having headaches or muscle aches. · Having a hard time getting to sleep or staying asleep. Panic disorder  You may have repeated panic attacks when there is no reason for feeling afraid. You may change your daily activities because you worry that you will have another attack. Symptoms may include:  · Intense fear, terror, or anxiety. · Trouble breathing or very fast breathing. · Chest pain or tightness. · A heartbeat that races or is not regular. Social anxiety disorder  Symptoms may include:  · Fear about a social situation, such as eating in front of others or speaking in public. You may worry a lot. Or you may be afraid that something bad will happen. · Anxiety that can cause you to blush, sweat, and feel shaky.   · A heartbeat that is faster than normal.  · A hard time focusing. Phobias  Symptoms may include:  · More fear than most people of being around an object, being in a situation, or doing an activity. You might also be stressed about the chance of being around the thing you fear. · Worry about losing control, panicking, fainting, or having physical symptoms like a faster heartbeat when you are around the situation or object. How are these disorders treated? Anxiety disorders can be treated with medicines or counseling. A combination of both may be used. Medicines may include:  · Antidepressants. These may help your symptoms by keeping chemicals in your brain in balance. · Benzodiazepines. These may give you short-term relief of your symptoms. Some people use cognitive-behavioral therapy. A therapist helps you learn to change stressful or bad thoughts into helpful thoughts. Lead a healthy lifestyle  A healthy lifestyle may help you feel better. · Get at least 30 minutes of exercise on most days of the week. Walking is a good choice. · Eat a healthy diet. Include fruits, vegetables, lean proteins, and whole grains in your diet each day. · Try to go to bed at the same time every night. Try for 8 hours of sleep a night. · Find ways to manage stress. Try relaxation exercises. · Avoid alcohol and illegal drugs. Follow-up care is a key part of your treatment and safety. Be sure to make and go to all appointments, and call your doctor if you are having problems. It's also a good idea to know your test results and keep a list of the medicines you take. Where can you learn more? Go to http://www.gray.com/  Enter J348 in the search box to learn more about \"Learning About Anxiety Disorders. \"  Current as of: June 16, 2021               Content Version: 13.0  © 5343-5670 [employer]. Care instructions adapted under license by [employer] (which disclaims liability or warranty for this information). If you have questions about a medical condition or this instruction, always ask your healthcare professional. [address] any warranty or liability for your use of this information. Preventing Falls: Care Instructions  Your Care Instructions    Getting around your home safely can be a challenge if you have injuries or health problems that make it easy for you to fall. Loose rugs and furniture in walkways are among the dangers for many older people who have problems walking or who have poor eyesight. People who have conditions such as arthritis, osteoporosis, or dementia also have to be careful not to fall. You can make your home safer with a few simple measures. Follow-up care is a key part of your treatment and safety. Be sure to make and go to all appointments, and call your doctor if you are having problems. It's also a good idea to know your test results and keep a list of the medicines you take. How can you care for yourself at home? Taking care of yourself  · You may get dizzy if you do not drink enough water. To prevent dehydration, drink plenty of fluids, enough so that your urine is light yellow or clear like water. Choose water and other caffeine-free clear liquids. If you have kidney, heart, or liver disease and have to limit fluids, talk with your doctor before you increase the amount of fluids you drink. · Exercise regularly to improve your strength, muscle tone, and balance. Walk if you can. Swimming may be a good choice if you cannot walk easily. · Have your vision and hearing checked each year or any time you notice a change. If you have trouble seeing and hearing, you might not be able to avoid objects and could lose your balance. · Know the side effects of the medicines you take. Ask your doctor or pharmacist whether the medicines you take can affect your balance. Sleeping pills or sedatives can affect your balance. · Limit the amount of alcohol you drink. Alcohol can impair your balance and other senses. · Ask your doctor whether calluses or corns on your feet need to be removed. If you wear loose-fitting shoes because of calluses or corns, you can lose your balance and fall. · Talk to your doctor if you have numbness in your feet. Preventing falls at home  · Remove raised doorway thresholds, throw rugs, and clutter. Repair loose carpet or raised areas in the floor. · Move furniture and electrical cords to keep them out of walking paths. · Use nonskid floor wax, and wipe up spills right away, especially on ceramic tile floors. · If you use a walker or cane, put rubber tips on it. If you use crutches, clean the bottoms of them regularly with an abrasive pad, such as steel wool. · Keep your house well lit, especially [employer], and outside walkways. Use night-lights in areas such as hallways and bathrooms. Add extra light switches or use remote switches (such as switches that go on or off when you clap your hands) to make it easier to turn lights on if you have to get up during the night. · Install sturdy handrails on stairways. · Move items in your cabinets so that the things you use a lot are on the lower shelves (about waist level). · Keep a cordless phone and a flashlight with new batteries by your bed. If possible, put a phone in each of the main rooms of your house, or carry a cell phone in case you fall and cannot reach a phone. Or, you can wear a device around your neck or wrist. You push a button that sends a signal for help. · Wear low-heeled shoes that fit well and give your feet good support. Use footwear with nonskid soles. Check the heels and soles of your shoes for wear. Repair or replace worn heels or soles. · Do not wear socks without shoes on wood floors. · Walk on the grass when the sidewalks are slippery. If you live in an area that gets snow and ice in the winter, sprinkle salt on slippery steps and sidewalks.   Preventing falls in the bath  · Install grab bars and nonskid mats inside and outside your shower or tub and near the toilet and sinks. · Use shower chairs and bath benches. · Use a hand-held shower head that will allow you to sit while showering. · Get into a tub or shower by putting the weaker leg in first. Get out of a tub or shower with your strong side first.  · Repair loose toilet seats and consider installing a raised toilet seat to make getting on and off the toilet easier. · Keep your bathroom door unlocked while you are in the shower. Where can you learn more? Go to http://www.naranjo.com/. Enter 0476 [id number] in the search box to learn more about \"Preventing Falls: Care Instructions. \"  Current as of: March 16, 2018  Content Version: 11.8  © 4743-5944 [employer]. Care instructions adapted under license by [employer] (which disclaims liability or warranty for this information). If you have questions about a medical condition or this instruction, always ask your healthcare professional. [place] [address] any warranty or liability for your use of this information.

## 2021-12-02 NOTE — PROGRESS NOTES
Chief Complaint   Patient presents with    Follow Up Chronic Condition     pt is asking for refills on vitamin d, zetia, meloxicam, losartan-htcz, meclizine, requip       1. Have you been to the ER, urgent care clinic since your last visit? Hospitalized since your last visit?no    2. Have you seen or consulted any other health care providers outside of the [address] [place] [address] since your last visit? Include any pap smears or colon screening.  no    Visit Vitals  BP (!) 169/99 (BP 1 Location: Right arm, BP Patient Position: At rest, BP Cuff Size: Adult)   Pulse 74   Temp 98.3 °F (36.8 °C) (Temporal)   Resp 18   Ht 5' 2\" (1.575 m)   Wt 160 lb (72.6 kg)   SpO2 98%   BMI 29.26 kg/m²

## 2021-12-02 NOTE — PROGRESS NOTES
HPI     Chief Complaint   Patient presents with    Follow Up Chronic Condition     pt is asking for refills on vitamin d, zetia, meloxicam, losartan-htcz, meclizine, requip        HPI:  Bushra Dallas is a 77 y.o. female who is here today for medication refill and to have her 4th finger on her right hand evaluated. Patient notes recurrent infection at the level of the fingernail. No history of trauma that she can remember. Warm to touch. No discharge noted or foul odor. Capillary refill brisk. Medication Refill    Diagnoses and all orders for this visit:    1. Encounter for immunization  -    Behind on Tdap and influenza immunizations. 2. GERD without esophagitis  -    Well controlled on  Omeprazole delayed release (PRILOSEC D/R) 20 mg table  3. Essential hypertension  Previously well controlled on  losartan-hydroCHLOROthiazide (HYZAAR) 100-12.5 mg per tablet; Take 1 Tablet by mouth daily. No HA/CP,change in vision. 4. Mixed hyperlipidemia  stable on   ezetimibe (ZETIA) 10 mg tablet; Take 1 Tablet by mouth daily. 5. Vitamin D deficiency  check vitamin D level at next appointment. Cholecalciferol (VITAMIN D3) (1000 Units /25 mcg) tablet; Take 1 Tablet by mouth daily. Indications: low vitamin D levels    6. Restless leg  -     Well controlled on rOPINIRole (REQUIP) 2 mg tablet; No Known Allergies    Current Outpatient Medications   Medication Sig    estradioL (ESTRACE) 0.01 % (0.1 mg/gram) vaginal cream insert 1 gram vaginally three times a week    Omeprazole delayed release (PRILOSEC D/R) 20 mg tablet Take 1 Tablet by mouth daily. Indications: gastroesophageal reflux disease    losartan-hydroCHLOROthiazide (HYZAAR) 100-12.5 mg per tablet Take 1 Tablet by mouth daily.  ezetimibe (ZETIA) 10 mg tablet Take 1 Tablet by mouth daily.  cholecalciferol (VITAMIN D3) (1000 Units /25 mcg) tablet Take 1 Tablet by mouth daily.  Indications: low vitamin D levels    rOPINIRole (REQUIP) 2 mg tablet Take 1 Tablet by mouth nightly.  meclizine (ANTIVERT) 25 mg tablet take 1 tablet by mouth three times a day if needed for dizziness for up to 10 days    meloxicam (MOBIC) 7.5 mg tablet Take 1 Tab by mouth daily. No current facility-administered medications for this visit. Review of Systems   Constitutional: Negative for chills, fever and malaise/fatigue. Respiratory: Negative for cough, shortness of breath and wheezing. Cardiovascular: Negative for chest pain, palpitations and leg swelling. Gastrointestinal: Negative for diarrhea and vomiting. Genitourinary: Negative for dysuria. Neurological: Negative for dizziness, tingling and weakness. Psychiatric/Behavioral: Negative for depression. The patient is not nervous/anxious. All other systems reviewed and are negative. Reviewed PmHx, FmHx, SocHx as well as meds and allergies, updated and dated in the chart. Objective     Visit Vitals  BP (!) 169/99 (BP 1 Location: Right arm, BP Patient Position: At rest, BP Cuff Size: Adult)   Pulse 74   Temp 98.3 °F (36.8 °C) (Temporal)   Resp 18   Ht 5' 2\" (1.575 m)   Wt 160 lb (72.6 kg)   SpO2 98%   BMI 29.26 kg/m²       Last PDMP Rei as Reviewed:  Review User Review Instant Review Result   Sara Funk 12/2/2021 12:26 PM Reviewed PDMP [1]     Physical Exam  Vitals and nursing note reviewed. Constitutional:       General: She is not in acute distress. Appearance: Normal appearance. She is normal weight. HENT:      Head: Normocephalic and atraumatic. Neck:      Thyroid: No thyroid mass or thyromegaly. Cardiovascular:      Rate and Rhythm: Normal rate and regular rhythm. Heart sounds: No murmur heard. Pulmonary:      Effort: Pulmonary effort is normal. No respiratory distress. Breath sounds: Normal breath sounds. No wheezing. Musculoskeletal:      Cervical back: Neck supple. No muscular tenderness. Right lower leg: No edema.       Left lower leg: No edema.   Lymphadenopathy:      Cervical: No cervical adenopathy. Skin:     General: Skin is warm and dry. Comments: No redness, discoloration. Neurological:      Mental Status: She is alert and oriented to person, place, and time. Mental status is at baseline. Psychiatric:         Attention and Perception: Attention and perception normal.         Mood and Affect: Mood and affect normal.         Speech: Speech normal.         Behavior: Behavior normal.             Assessment and Plan     Diagnoses and all orders for this visit:    1. Encounter for immunization  -     TETANUS, DIPHTHERIA TOXOIDS AND ACELLULAR PERTUSSIS VACCINE (TDAP), IN INDIVIDS. >=7, IM  -     FLU (FLUAD QUAD INFLUENZA VACCINE,QUAD,ADJUVANTED)    2. GERD without esophagitis  -    GERD Well controlled. No burning pain in the chest. Continue Omeprazole delayed release (PRILOSEC D/R) 20 mg tablet; Take 1 Tablet by mouth daily. Indications: gastroesophageal reflux disease    3. Essential hypertension  Comments:  BP elevated today in office. Patient has not taken her medication today. Patient states that she is anxious about the appointment. Continue on losartan-hydroCHLOROthiazide (HYZAAR) 100-12.5 mg per tablet; Take 1   Orders:   Track BP for two weeks, 2 hours after waking up, two hours prior to bed. Call office with results. .    4. Mixed hyperlipidemia  Comments:  stable on  ezetimibe (ZETIA) 10 mg tablet;  Orders:  -    Fasting labs in one month. Patient not fasting prior to today's appointment. 5. Vitamin D deficiency  Comments:  check vitamin D level at next visit. Continue on  cholecalciferol (VITAMIN D3) (1000 Units /25 mcg) tablet Pain or muscle weakness. 6. Restless leg   Comments: Patient denies any symptoms of restless leg.   -    Continue on rOPINIRole (REQUIP) 2 mg tablet; Take 1 Tablet by mouth nightly. 7. Paranoia:    Comments: Patient states she feels that she is being followed.  No suicidal or homicidal ideation. Order: Psychiatric evaluation referral.                 Medication Side Effects and Warnings were discussed with patient. Patient Labs were reviewed and or requested. Patient Past Records were reviewed and or requested. Follow-up and Dispositions    Return in about 1 month (around 1/2/2022), or if symptoms worsen or fail to improve, for Re-evaluate anxiety. I have discussed the diagnosis with the patient and the intended plan as seen in the above orders. The patient has received an after-visit summary and questions were answered concerning future plans. I have discussed medication side effects and warnings with the patient as well. Cl Fernandez, [address] [facility]  [address]

## 2021-12-30 DIAGNOSIS — I10 ESSENTIAL HYPERTENSION: ICD-10-CM

## 2021-12-30 RX ORDER — LOSARTAN POTASSIUM AND HYDROCHLOROTHIAZIDE 12.5; 1 MG/1; MG/1
TABLET ORAL
Qty: 30 TABLET | Refills: 2 | Status: SHIPPED | OUTPATIENT
Start: 2021-12-30 | End: 2022-05-23 | Stop reason: SDUPTHER

## 2022-03-18 PROBLEM — Z91.81 AT HIGH RISK FOR FALLS: Status: ACTIVE | Noted: 2021-04-27

## 2022-03-19 PROBLEM — G25.81 RESTLESS LEG: Status: ACTIVE | Noted: 2021-03-16

## 2022-03-19 PROBLEM — E55.9 VITAMIN D DEFICIENCY: Status: ACTIVE | Noted: 2021-03-16

## 2022-03-19 PROBLEM — E78.2 MIXED HYPERLIPIDEMIA: Status: ACTIVE | Noted: 2021-03-16

## 2022-03-19 PROBLEM — K21.9 GERD WITHOUT ESOPHAGITIS: Status: ACTIVE | Noted: 2021-03-16

## 2022-03-20 PROBLEM — I10 ESSENTIAL HYPERTENSION: Status: ACTIVE | Noted: 2021-03-16

## 2022-03-20 PROBLEM — F22 PARANOID (HCC): Status: ACTIVE | Noted: 2021-12-02

## 2022-05-23 ENCOUNTER — OFFICE VISIT (OUTPATIENT)
Dept: PRIMARY CARE CLINIC | Age: 77
End: 2022-05-23

## 2022-05-23 VITALS
DIASTOLIC BLOOD PRESSURE: 65 MMHG | WEIGHT: 144.2 LBS | TEMPERATURE: 98.6 F | SYSTOLIC BLOOD PRESSURE: 115 MMHG | BODY MASS INDEX: 26.54 KG/M2 | RESPIRATION RATE: 16 BRPM | HEART RATE: 83 BPM | HEIGHT: 62 IN

## 2022-05-23 DIAGNOSIS — G25.81 RESTLESS LEG: ICD-10-CM

## 2022-05-23 DIAGNOSIS — F32.2 CURRENT SEVERE EPISODE OF MAJOR DEPRESSIVE DISORDER WITHOUT PSYCHOTIC FEATURES, UNSPECIFIED WHETHER RECURRENT (HCC): ICD-10-CM

## 2022-05-23 DIAGNOSIS — R63.0 POOR APPETITE: ICD-10-CM

## 2022-05-23 DIAGNOSIS — K21.9 GERD WITHOUT ESOPHAGITIS: ICD-10-CM

## 2022-05-23 DIAGNOSIS — E78.2 MIXED HYPERLIPIDEMIA: ICD-10-CM

## 2022-05-23 DIAGNOSIS — M15.9 PRIMARY OSTEOARTHRITIS INVOLVING MULTIPLE JOINTS: ICD-10-CM

## 2022-05-23 DIAGNOSIS — I10 ESSENTIAL HYPERTENSION: Primary | ICD-10-CM

## 2022-05-23 DIAGNOSIS — E55.9 VITAMIN D DEFICIENCY: ICD-10-CM

## 2022-05-23 PROCEDURE — 99215 OFFICE O/P EST HI 40 MIN: CPT | Performed by: FAMILY MEDICINE

## 2022-05-23 RX ORDER — ROPINIROLE 2 MG/1
2 TABLET, FILM COATED ORAL
Qty: 90 TABLET | Refills: 1 | Status: SHIPPED | OUTPATIENT
Start: 2022-05-23 | End: 2022-10-10 | Stop reason: SDUPTHER

## 2022-05-23 RX ORDER — MELOXICAM 15 MG/1
15 TABLET ORAL DAILY
Qty: 90 TABLET | Refills: 1 | Status: SHIPPED | OUTPATIENT
Start: 2022-05-23 | End: 2022-05-27

## 2022-05-23 RX ORDER — LOSARTAN POTASSIUM AND HYDROCHLOROTHIAZIDE 12.5; 1 MG/1; MG/1
1 TABLET ORAL DAILY
Qty: 90 TABLET | Refills: 1 | Status: SHIPPED | OUTPATIENT
Start: 2022-05-23 | End: 2022-05-27

## 2022-05-23 RX ORDER — PHENOL/SODIUM PHENOLATE
20 AEROSOL, SPRAY (ML) MUCOUS MEMBRANE DAILY
Qty: 90 TABLET | Refills: 1 | Status: SHIPPED | OUTPATIENT
Start: 2022-05-23 | End: 2022-10-10 | Stop reason: ALTCHOICE

## 2022-05-23 RX ORDER — MELATONIN
1000 DAILY
Qty: 90 TABLET | Refills: 1 | Status: SHIPPED | OUTPATIENT
Start: 2022-05-23 | End: 2022-10-10 | Stop reason: SDUPTHER

## 2022-05-23 RX ORDER — ESTRADIOL 0.1 MG/G
CREAM VAGINAL
Qty: 42.5 G | Refills: 1 | Status: SHIPPED | OUTPATIENT
Start: 2022-05-23

## 2022-05-23 RX ORDER — MIRTAZAPINE 15 MG/1
15 TABLET, FILM COATED ORAL
Qty: 90 TABLET | Refills: 1 | Status: SHIPPED | OUTPATIENT
Start: 2022-05-23 | End: 2022-10-10 | Stop reason: SDUPTHER

## 2022-05-23 RX ORDER — EZETIMIBE 10 MG/1
10 TABLET ORAL DAILY
Qty: 90 TABLET | Refills: 1 | Status: SHIPPED | OUTPATIENT
Start: 2022-05-23 | End: 2022-09-12

## 2022-05-23 NOTE — PROGRESS NOTES
1. \"Have you been to the ER, urgent care clinic since your last visit? Hospitalized since your last visit? \" No    2. \"Have you seen or consulted any other health care providers outside of the [address] [place] [address] since your last visit? \" No     3. For patients aged 39-70: Has the patient had a colonoscopy / FIT/ Cologuard? Yes - Care Gap present. Rooming MA/LPN to request most recent results 3 years in [facility]      If the patient is female:    4. For patients aged 41-77: Has the patient had a mammogram within the past 2 years? Yes - Care Gap present. Rooming MA/LPN to request most recent results May 2022 [facility]      5. For patients aged 21-65: Has the patient had a pap smear?  NA - based on age or sex      Visit Vitals  /65 (BP 1 Location: Left arm)   Pulse 83   Temp 98.6 °F (37 °C) (Oral)   Resp 16   Ht 5' 2\" (1.575 m)   Wt 144 lb 3.2 oz (65.4 kg)   BMI 26.37 kg/m²       Chief Complaint   Patient presents with    Follow Up Chronic Condition    Hypertension

## 2022-05-23 NOTE — PROGRESS NOTES
Asutin Moise (: 1945) is a 77 y.o. female, established patient, here for evaluation of the following chief complaint(s):  Follow Up Chronic Condition and Hypertension       ASSESSMENT/PLAN:  Below is the assessment and plan developed based on review of pertinent history, physical exam, labs, studies, and medications. 1. Essential hypertension  Chronic, controlled  -     METABOLIC PANEL, COMPREHENSIVE  -     CBC WITH AUTOMATED DIFF  -     LIPID PANEL  -     losartan-hydroCHLOROthiazide (HYZAAR) 100-12.5 mg per tablet; Take 1 Tablet by mouth daily. , Normal, Disp-90 Tablet, R-1  Refill provided for blood pressure medication. DASH diet. Stay active. 2. Mixed hyperlipidemia  Chronic  -     LIPID PANEL  -     ezetimibe (ZETIA) 10 mg tablet; Take 1 Tablet by mouth daily. , Normal, Disp-90 Tablet, R-1  Lipids reasonably well controlled, taking Zetia, lipid panel today. Amenable to starting statin if needed. 3. GERD without esophagitis  Chronic, stable  -     METABOLIC PANEL, COMPREHENSIVE  -     CBC WITH AUTOMATED DIFF  -     Omeprazole delayed release (PRILOSEC D/R) 20 mg tablet; Take 1 Tablet by mouth daily. Indications: gastroesophageal reflux disease, Normal, Disp-90 Tablet, R-1  4. Vitamin D deficiency  Chronic  -     VITAMIN D, 25 HYDROXY  -     cholecalciferol (VITAMIN D3) (1000 Units /25 mcg) tablet; Take 1 Tablet by mouth daily. Indications: low vitamin D levels, Normal, Disp-90 Tablet, R-1  5. Restless leg  Chronic, stable  -     rOPINIRole (REQUIP) 2 mg tablet; Take 1 Tablet by mouth nightly., Normal, Disp-90 Tablet, R-1  6. Poor appetite  New  -     TSH RFX ON ABNORMAL TO FREE T4  7. Primary osteoarthritis involving multiple joints  Chronic  Orders:  -     meloxicam (MOBIC) 15 mg tablet; Take 1 Tablet by mouth daily. , Normal, Disp-90 Tablet, R-1  Increased from 7.5 to 15 mg.  8. Current severe episode of major depressive disorder without psychotic features, unspecified whether recurrent ([facility]  -     mirtazapine (REMERON) 15 mg tablet; Take 1 Tablet by mouth nightly., Normal, Disp-90 Tablet, R-1  Positive depression screening, start mirtazapine for depression treatment and help with appetite. Return in about 3 months (around 8/23/2022) for medicare wellness, sub. SUBJECTIVE/OBJECTIVE:  HPI    78-year-old female history of hypertension, hyperlipidemia, GERD, vitamin D deficiency, osteoarthritis presents for routine office follow-up. Patient tells me she has been having a poor appetite, there has been associated weight loss of over 15 pounds. Patient reports early satiety, will eat small portions many times a day, hardly finishes meals completely. 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 States she is depressed, sleeping poorly, has trouble concentrating, has no feelings of guilt or suicide, and affirms anhedonia. She is also prescribed meloxicam for joint pain, joint pain is primarily in her shoulders, knees, hands. Pain is 50% less severe than it was before she started meloxicam.  She did switch from Celebrex to meloxicam because of treatment failure with Celebrex. She does not take any other analgesics p.o. or use topical agents. Mammogram up-to-date, done at [place] [address] [facility] women. Colonoscopy done 3 years ago, discovery of benign polyps. Patient does not smoke. Patient denies cough, hemoptysis, nausea, vomiting, fever or chills. PHQ 9 Score: 24 (5/23/2022 10:03 AM)    No Known Allergies  Current Outpatient Medications   Medication Sig    losartan-hydroCHLOROthiazide (HYZAAR) 100-12.5 mg per tablet Take 1 Tablet by mouth daily.  rOPINIRole (REQUIP) 2 mg tablet Take 1 Tablet by mouth nightly.  Omeprazole delayed release (PRILOSEC D/R) 20 mg tablet Take 1 Tablet by mouth daily. Indications: gastroesophageal reflux disease    meloxicam (MOBIC) 15 mg tablet Take 1 Tablet by mouth daily.  ezetimibe (ZETIA) 10 mg tablet Take 1 Tablet by mouth daily.     estradioL (ESTRACE) 0.01 % (0.1 mg/gram) vaginal cream insert 1 gram vaginally three times a week    cholecalciferol (VITAMIN D3) (1000 Units /25 mcg) tablet Take 1 Tablet by mouth daily. Indications: low vitamin D levels    mirtazapine (REMERON) 15 mg tablet Take 1 Tablet by mouth nightly.  meclizine (ANTIVERT) 25 mg tablet take 1 tablet by mouth three times a day if needed for dizziness for up to 10 days     No current facility-administered medications for this visit. Past Medical History:   Diagnosis Date    Diabetes ([address]     Hypercholesterolemia     Hypertension      Past Surgical History:   Procedure Laterality Date    HX ORTHOPAEDIC  09/2018    Hip repair      Family History   Problem Relation Age of Onset    Diabetes Other     Heart Disease Other     Lung Cancer Other      Social History     Tobacco Use   Smoking Status Never Smoker   Smokeless Tobacco Never Used         Review of Systems   All other systems reviewed and are negative. /65 (BP 1 Location: Left arm)   Pulse 83   Temp 98.6 °F (37 °C) (Oral)   Resp 16   Ht 5' 2\" (1.575 m)   Wt 144 lb 3.2 oz (65.4 kg)   BMI 26.37 kg/m²    Physical Exam  Vitals reviewed. Constitutional:       Appearance: Normal appearance. Eyes:      Conjunctiva/sclera: Conjunctivae normal.   Neck:      Vascular: No carotid bruit. Cardiovascular:      Rate and Rhythm: Normal rate and regular rhythm. Pulses: Normal pulses. Heart sounds: Normal heart sounds. Pulmonary:      Breath sounds: Normal breath sounds. Abdominal:      General: Bowel sounds are normal.      Palpations: Abdomen is soft. Musculoskeletal:         General: Normal range of motion. Cervical back: Neck supple. Right lower leg: No edema. Left lower leg: No edema. Skin:     General: Skin is warm. Capillary Refill: Capillary refill takes less than 2 seconds. Neurological:      General: No focal deficit present.       Mental Status: She is alert and oriented to person, place, and time. Psychiatric:      Comments: Flat affect             On this date 05/23/2022 I have spent 40 minutes reviewing previous notes, test results and face to face with the patient discussing the diagnosis and importance of compliance with the treatment plan as well as documenting on the day of the visit. An electronic signature was used to authenticate this note.   -- Demario Henderson MD   [facility] [address]  [address]

## 2022-05-24 ENCOUNTER — HOSPITAL ENCOUNTER (INPATIENT)
Age: 77
LOS: 3 days | Discharge: HOME | DRG: 640 | End: 2022-05-27
Attending: EMERGENCY MEDICINE | Admitting: INTERNAL MEDICINE
Payer: MEDICARE

## 2022-05-24 ENCOUNTER — TELEPHONE (OUTPATIENT)
Dept: PRIMARY CARE CLINIC | Age: 77
End: 2022-05-24

## 2022-05-24 DIAGNOSIS — R89.9 ABNORMAL LABORATORY TEST RESULT: Primary | ICD-10-CM

## 2022-05-24 PROBLEM — N17.0 ATN (ACUTE TUBULAR NECROSIS) (HCC): Status: ACTIVE | Noted: 2022-05-24

## 2022-05-24 PROBLEM — N17.9 AKI (ACUTE KIDNEY INJURY) (HCC): Status: ACTIVE | Noted: 2022-05-24

## 2022-05-24 PROBLEM — N39.0 UTI (URINARY TRACT INFECTION): Status: ACTIVE | Noted: 2022-05-24

## 2022-05-24 LAB
25(OH)D3+25(OH)D2 SERPL-MCNC: 29.1 NG/ML (ref 30–100)
ALBUMIN SERPL-MCNC: 2.8 G/DL (ref 3.5–5)
ALBUMIN SERPL-MCNC: 3.5 G/DL (ref 3.7–4.7)
ALBUMIN/GLOB SERPL: 0.6 {RATIO} (ref 1.1–2.2)
ALBUMIN/GLOB SERPL: 1 {RATIO} (ref 1.2–2.2)
ALP SERPL-CCNC: 137 U/L (ref 45–117)
ALP SERPL-CCNC: 152 IU/L (ref 44–121)
ALT SERPL-CCNC: 11 IU/L (ref 0–32)
ALT SERPL-CCNC: 17 U/L (ref 12–78)
ANION GAP SERPL CALC-SCNC: 6 MMOL/L (ref 5–15)
APPEARANCE UR: ABNORMAL
AST SERPL W P-5'-P-CCNC: 16 U/L (ref 15–37)
AST SERPL-CCNC: 18 IU/L (ref 0–40)
BACTERIA URNS QL MICRO: NEGATIVE /HPF
BASOPHILS # BLD AUTO: 0 X10E3/UL (ref 0–0.2)
BASOPHILS # BLD: 0 K/UL (ref 0–0.1)
BASOPHILS NFR BLD AUTO: 0 %
BASOPHILS NFR BLD: 0 % (ref 0–1)
BILIRUB SERPL-MCNC: 0.3 MG/DL (ref 0–1.2)
BILIRUB SERPL-MCNC: 0.5 MG/DL (ref 0.2–1)
BILIRUB UR QL: NEGATIVE
BNP SERPL-MCNC: 290 PG/ML
BUN SERPL-MCNC: 24 MG/DL (ref 8–27)
BUN SERPL-MCNC: 26 MG/DL (ref 6–20)
BUN/CREAT SERPL: 10 (ref 12–20)
BUN/CREAT SERPL: 8 (ref 12–28)
CA-I BLD-MCNC: 9 MG/DL (ref 8.5–10.1)
CALCIUM SERPL-MCNC: 9 MG/DL (ref 8.7–10.3)
CHLORIDE SERPL-SCNC: 102 MMOL/L (ref 97–108)
CHLORIDE SERPL-SCNC: 96 MMOL/L (ref 96–106)
CHOLEST SERPL-MCNC: 172 MG/DL (ref 100–199)
CO2 SERPL-SCNC: 22 MMOL/L (ref 20–29)
CO2 SERPL-SCNC: 28 MMOL/L (ref 21–32)
COLOR UR: ABNORMAL
CREAT SERPL-MCNC: 2.73 MG/DL (ref 0.55–1.02)
CREAT SERPL-MCNC: 3.03 MG/DL (ref 0.57–1)
CREAT UR-MCNC: 101 MG/DL
DIFFERENTIAL METHOD BLD: ABNORMAL
EGFR: 16 ML/MIN/1.73
EOSINOPHIL # BLD AUTO: 0.1 X10E3/UL (ref 0–0.4)
EOSINOPHIL # BLD: 0.1 K/UL (ref 0–0.4)
EOSINOPHIL NFR BLD AUTO: 1 %
EOSINOPHIL NFR BLD: 1 % (ref 0–7)
ERYTHROCYTE [DISTWIDTH] IN BLOOD BY AUTOMATED COUNT: 13.1 % (ref 11.7–15.4)
ERYTHROCYTE [DISTWIDTH] IN BLOOD BY AUTOMATED COUNT: 13.8 % (ref 11.5–14.5)
GLOBULIN SER CALC-MCNC: 3.4 G/DL (ref 1.5–4.5)
GLOBULIN SER CALC-MCNC: 4.6 G/DL (ref 2–4)
GLUCOSE SERPL-MCNC: 111 MG/DL (ref 65–100)
GLUCOSE SERPL-MCNC: 115 MG/DL (ref 65–99)
GLUCOSE UR STRIP.AUTO-MCNC: NEGATIVE MG/DL
HCT VFR BLD AUTO: 33.3 % (ref 35–47)
HCT VFR BLD AUTO: 34.6 % (ref 34–46.6)
HDLC SERPL-MCNC: 40 MG/DL
HGB BLD-MCNC: 10.9 G/DL (ref 11.5–16)
HGB BLD-MCNC: 11.3 G/DL (ref 11.1–15.9)
HGB UR QL STRIP: NEGATIVE
IMM GRANULOCYTES # BLD AUTO: 0.1 K/UL (ref 0–0.04)
IMM GRANULOCYTES # BLD AUTO: 0.1 X10E3/UL (ref 0–0.1)
IMM GRANULOCYTES NFR BLD AUTO: 1 %
IMM GRANULOCYTES NFR BLD AUTO: 1 % (ref 0–0.5)
KETONES UR QL STRIP.AUTO: NEGATIVE MG/DL
LDLC SERPL CALC-MCNC: 108 MG/DL (ref 0–99)
LEUKOCYTE ESTERASE UR QL STRIP.AUTO: ABNORMAL
LYMPHOCYTES # BLD AUTO: 1.7 X10E3/UL (ref 0.7–3.1)
LYMPHOCYTES # BLD: 2.1 K/UL (ref 0.8–3.5)
LYMPHOCYTES NFR BLD AUTO: 15 %
LYMPHOCYTES NFR BLD: 17 % (ref 12–49)
MCH RBC QN AUTO: 28.8 PG (ref 26.6–33)
MCH RBC QN AUTO: 29.1 PG (ref 26–34)
MCHC RBC AUTO-ENTMCNC: 32.7 G/DL (ref 30–36.5)
MCHC RBC AUTO-ENTMCNC: 32.7 G/DL (ref 31.5–35.7)
MCV RBC AUTO: 88 FL (ref 79–97)
MCV RBC AUTO: 89 FL (ref 80–99)
MONOCYTES # BLD AUTO: 1.2 X10E3/UL (ref 0.1–0.9)
MONOCYTES # BLD: 1.3 K/UL (ref 0–1)
MONOCYTES NFR BLD AUTO: 10 %
MONOCYTES NFR BLD: 11 % (ref 5–13)
NEUTROPHILS # BLD AUTO: 8.5 X10E3/UL (ref 1.4–7)
NEUTROPHILS NFR BLD AUTO: 73 %
NEUTS SEG # BLD: 8.7 K/UL (ref 1.8–8)
NEUTS SEG NFR BLD: 70 % (ref 32–75)
NITRITE UR QL STRIP.AUTO: NEGATIVE
NRBC # BLD: 0 K/UL (ref 0–0.01)
NRBC BLD-RTO: 0 PER 100 WBC
PH UR STRIP: 5 [PH] (ref 5–8)
PLATELET # BLD AUTO: 487 X10E3/UL (ref 150–450)
PLATELET # BLD AUTO: 494 K/UL (ref 150–400)
PMV BLD AUTO: 9.8 FL (ref 8.9–12.9)
POTASSIUM SERPL-SCNC: 3.2 MMOL/L (ref 3.5–5.1)
POTASSIUM SERPL-SCNC: 3.8 MMOL/L (ref 3.5–5.2)
PROT SERPL-MCNC: 6.9 G/DL (ref 6–8.5)
PROT SERPL-MCNC: 7.4 G/DL (ref 6.4–8.2)
PROT UR STRIP-MCNC: NEGATIVE MG/DL
RBC # BLD AUTO: 3.74 M/UL (ref 3.8–5.2)
RBC # BLD AUTO: 3.93 X10E6/UL (ref 3.77–5.28)
RBC #/AREA URNS HPF: ABNORMAL /HPF (ref 0–5)
SODIUM SERPL-SCNC: 136 MMOL/L (ref 136–145)
SODIUM SERPL-SCNC: 137 MMOL/L (ref 134–144)
SODIUM UR-SCNC: 38 MMOL/L
SP GR UR REFRACTOMETRY: 1.01 (ref 1–1.03)
TRIGL SERPL-MCNC: 132 MG/DL (ref 0–149)
TSH SERPL DL<=0.005 MIU/L-ACNC: 1.58 UIU/ML (ref 0.45–4.5)
UROBILINOGEN UR QL STRIP.AUTO: 0.1 EU/DL (ref 0.1–1)
VLDLC SERPL CALC-MCNC: 24 MG/DL (ref 5–40)
WBC # BLD AUTO: 11.7 X10E3/UL (ref 3.4–10.8)
WBC # BLD AUTO: 12.4 K/UL (ref 3.6–11)
WBC URNS QL MICRO: ABNORMAL /HPF (ref 0–4)

## 2022-05-24 PROCEDURE — 74011250637 HC RX REV CODE- 250/637: Performed by: INTERNAL MEDICINE

## 2022-05-24 PROCEDURE — 84300 ASSAY OF URINE SODIUM: CPT

## 2022-05-24 PROCEDURE — 83880 ASSAY OF NATRIURETIC PEPTIDE: CPT

## 2022-05-24 PROCEDURE — 96360 HYDRATION IV INFUSION INIT: CPT

## 2022-05-24 PROCEDURE — 96361 HYDRATE IV INFUSION ADD-ON: CPT

## 2022-05-24 PROCEDURE — 99285 EMERGENCY DEPT VISIT HI MDM: CPT

## 2022-05-24 PROCEDURE — 65270000029 HC RM PRIVATE

## 2022-05-24 PROCEDURE — 74011000250 HC RX REV CODE- 250: Performed by: INTERNAL MEDICINE

## 2022-05-24 PROCEDURE — 36415 COLL VENOUS BLD VENIPUNCTURE: CPT

## 2022-05-24 PROCEDURE — 85025 COMPLETE CBC W/AUTO DIFF WBC: CPT

## 2022-05-24 PROCEDURE — 80053 COMPREHEN METABOLIC PANEL: CPT

## 2022-05-24 PROCEDURE — 82570 ASSAY OF URINE CREATININE: CPT

## 2022-05-24 PROCEDURE — 81001 URINALYSIS AUTO W/SCOPE: CPT

## 2022-05-24 PROCEDURE — 74011250636 HC RX REV CODE- 250/636: Performed by: INTERNAL MEDICINE

## 2022-05-24 PROCEDURE — 74011250636 HC RX REV CODE- 250/636: Performed by: EMERGENCY MEDICINE

## 2022-05-24 RX ORDER — ACETAMINOPHEN 650 MG/1
650 SUPPOSITORY RECTAL
Status: DISCONTINUED | OUTPATIENT
Start: 2022-05-24 | End: 2022-05-27 | Stop reason: HOSPADM

## 2022-05-24 RX ORDER — ONDANSETRON 4 MG/1
4 TABLET, ORALLY DISINTEGRATING ORAL
Status: DISCONTINUED | OUTPATIENT
Start: 2022-05-24 | End: 2022-05-27 | Stop reason: HOSPADM

## 2022-05-24 RX ORDER — POLYETHYLENE GLYCOL 3350 17 G/17G
17 POWDER, FOR SOLUTION ORAL DAILY PRN
Status: DISCONTINUED | OUTPATIENT
Start: 2022-05-24 | End: 2022-05-27 | Stop reason: HOSPADM

## 2022-05-24 RX ORDER — ACETAMINOPHEN 325 MG/1
650 TABLET ORAL
Status: DISCONTINUED | OUTPATIENT
Start: 2022-05-24 | End: 2022-05-27 | Stop reason: HOSPADM

## 2022-05-24 RX ORDER — SODIUM CHLORIDE 0.9 % (FLUSH) 0.9 %
5-40 SYRINGE (ML) INJECTION AS NEEDED
Status: DISCONTINUED | OUTPATIENT
Start: 2022-05-24 | End: 2022-05-27 | Stop reason: HOSPADM

## 2022-05-24 RX ORDER — PANTOPRAZOLE SODIUM 20 MG/1
40 TABLET, DELAYED RELEASE ORAL
Status: DISCONTINUED | OUTPATIENT
Start: 2022-05-25 | End: 2022-05-27 | Stop reason: HOSPADM

## 2022-05-24 RX ORDER — SODIUM CHLORIDE 9 MG/ML
75 INJECTION, SOLUTION INTRAVENOUS CONTINUOUS
Status: DISCONTINUED | OUTPATIENT
Start: 2022-05-24 | End: 2022-05-27

## 2022-05-24 RX ORDER — MIRTAZAPINE 15 MG/1
15 TABLET, FILM COATED ORAL
Status: DISCONTINUED | OUTPATIENT
Start: 2022-05-24 | End: 2022-05-27 | Stop reason: HOSPADM

## 2022-05-24 RX ORDER — SODIUM CHLORIDE 0.9 % (FLUSH) 0.9 %
5-40 SYRINGE (ML) INJECTION EVERY 8 HOURS
Status: DISCONTINUED | OUTPATIENT
Start: 2022-05-24 | End: 2022-05-27 | Stop reason: HOSPADM

## 2022-05-24 RX ORDER — ENOXAPARIN SODIUM 100 MG/ML
30 INJECTION SUBCUTANEOUS DAILY
Status: DISCONTINUED | OUTPATIENT
Start: 2022-05-25 | End: 2022-05-26 | Stop reason: DRUGHIGH

## 2022-05-24 RX ORDER — ONDANSETRON 2 MG/ML
4 INJECTION INTRAMUSCULAR; INTRAVENOUS
Status: DISCONTINUED | OUTPATIENT
Start: 2022-05-24 | End: 2022-05-27 | Stop reason: HOSPADM

## 2022-05-24 RX ORDER — ROPINIROLE 1 MG/1
2 TABLET, FILM COATED ORAL
Status: DISCONTINUED | OUTPATIENT
Start: 2022-05-24 | End: 2022-05-27 | Stop reason: HOSPADM

## 2022-05-24 RX ADMIN — SODIUM CHLORIDE, PRESERVATIVE FREE 10 ML: 5 INJECTION INTRAVENOUS at 22:25

## 2022-05-24 RX ADMIN — ROPINIROLE HYDROCHLORIDE 2 MG: 1 TABLET, FILM COATED ORAL at 22:24

## 2022-05-24 RX ADMIN — SODIUM CHLORIDE 75 ML/HR: 9 INJECTION, SOLUTION INTRAVENOUS at 22:32

## 2022-05-24 RX ADMIN — WATER 1 G: 1 INJECTION INTRAMUSCULAR; INTRAVENOUS; SUBCUTANEOUS at 22:32

## 2022-05-24 RX ADMIN — SODIUM CHLORIDE 500 ML: 9 INJECTION, SOLUTION INTRAVENOUS at 11:58

## 2022-05-24 RX ADMIN — MIRTAZAPINE 15 MG: 15 TABLET, FILM COATED ORAL at 22:25

## 2022-05-24 RX ADMIN — SODIUM CHLORIDE 1000 ML: 9 INJECTION, SOLUTION INTRAVENOUS at 12:58

## 2022-05-24 NOTE — PROGRESS NOTES
Protonix 40 mg PO was therapeutically interchanged for Prilosec 20 mg PO per the P&T Committee approved Therapeutic Interchanges Policy.     Young Jeter MS, Pharmacist  5/24/2022 6:33 PM

## 2022-05-24 NOTE — PROGRESS NOTES
Reason for Admission:  Abnormal Labs                     RUR Score:    N/A                 Plan for utilizing home health:   Not at this time      PCP: First and Last name:  Neal Romero MD     Name of Practice:    Are you a current patient: Yes/No:    Approximate date of last visit: 5/23   Can you participate in a virtual visit with your PCP:                     Current Advanced Directive/Advance Care Plan: Full Code      Healthcare Decision Maker:   Click here to complete [address] including selection of the [address] Relationship (ie \"Primary\")             Primary Decision MakeHaleighmitzy Armando Child - [phone]    Secondary Decision Maker: Octaviamitzy Bartolo - Son - [phone]                  Transition of Care Plan:      Met f/f with Pt, she confirmed that the information on the face sheet is correct. Pt stated that she lives with her tow son, she stated that one son is disabled and the other son helps her with him. Pt stated no HH, has crutches, and is independent with ADL. Pt stated that she uses [employer] [facility]. Pt stated that her car is parked her in the parking lot and she can drive herself home when she is D/C. CM Dispo: Home with no needs at this time.

## 2022-05-24 NOTE — ED TRIAGE NOTES
Pt arrives from pcp due to abnormal lab results. In no acute distress. Denies CP/SOB.      Abnormal renal panel per patient

## 2022-05-24 NOTE — ED PROVIDER NOTES
EMERGENCY DEPARTMENT HISTORY AND PHYSICAL EXAM      Date: 5/24/2022  Patient Name: Barrett Ivan      History of Presenting Illness     Chief Complaint   Patient presents with    Abnormal Lab Results       History Provided By: Patient    HPI: Barrett Ivan, 77 y.o. female with a past medical history significant for Arthritis, DM, HTN, HLD presents to the ED with cc of abnormal labs. Went to get routine labs and was told her kidney function went from \"600\" to \"300\" (GFR 60->30?). States she does not drink enough water and might be dehydrated. Has issues of intermittent diarrhea for some time, last episode ended 2 weeks ago but was having daily watery diarrhea for a few weeks. Denies current diarrhea, abd pain, CP, SOB, F/C/N/V. Denies dysuria, frequency, hematuria but states she has \"right kidney pain. \"    There are no other complaints, changes, or physical findings at this time. PCP: Elsie Solorio MD    Current Facility-Administered Medications   Medication Dose Route Frequency Provider Last Rate Last Admin    sodium chloride 0.9 % bolus infusion 500 mL  500 mL IntraVENous ONCE Aravind Mendoza MD         Current Outpatient Medications   Medication Sig Dispense Refill    losartan-hydroCHLOROthiazide (HYZAAR) 100-12.5 mg per tablet Take 1 Tablet by mouth daily. 90 Tablet 1    rOPINIRole (REQUIP) 2 mg tablet Take 1 Tablet by mouth nightly. 90 Tablet 1    Omeprazole delayed release (PRILOSEC D/R) 20 mg tablet Take 1 Tablet by mouth daily. Indications: gastroesophageal reflux disease 90 Tablet 1    meloxicam (MOBIC) 15 mg tablet Take 1 Tablet by mouth daily. 90 Tablet 1    ezetimibe (ZETIA) 10 mg tablet Take 1 Tablet by mouth daily. 90 Tablet 1    estradioL (ESTRACE) 0.01 % (0.1 mg/gram) vaginal cream insert 1 gram vaginally three times a week 42.5 g 1    cholecalciferol (VITAMIN D3) (1000 Units /25 mcg) tablet Take 1 Tablet by mouth daily.  Indications: low vitamin D levels 90 Tablet 1    mirtazapine (REMERON) 15 mg tablet Take 1 Tablet by mouth nightly. 90 Tablet 1    meclizine (ANTIVERT) 25 mg tablet take 1 tablet by mouth three times a day if needed for dizziness for up to 10 days 30 Tablet 1       Past History     Past Medical History:  Past Medical History:   Diagnosis Date    Diabetes ([address]     Hypercholesterolemia     Hypertension        Past Surgical History:  Past Surgical History:   Procedure Laterality Date    HX ORTHOPAEDIC  09/2018    Hip repair        Family History:  Family History   Problem Relation Age of Onset    Diabetes Other     Heart Disease Other     Lung Cancer Other        Social History:  Social History     Tobacco Use    Smoking status: Never Smoker    Smokeless tobacco: Never Used   Vaping Use    Vaping Use: Never used   Substance Use Topics    Alcohol use: Yes     Comment: occasional     Drug use: Not Currently       Allergies:  No Known Allergies      Review of Systems   Constitutional: Negative except as in HPI. Eyes: Negative except as in HPI.  ENT: Negative except as in HPI. Cardiovascular: Negative except as in HPI. Respiratory: Negative except as in HPI. Gastrointestinal: Negative except as in HPI. Genitourinary: Negative except as in HPI. Musculoskeletal: Negative except as in HPI. Integumentary: Negative except as in HPI. Neurological: Negative except as in HPI. Psychiatric: Negative except as in HPI. Endocrine: Negative except as in HPI. Hematologic/Lymphatic: Negative except as in HPI. Allergic/Immunologic: Negative except as in HPI. Physical Exam   Constitutional: Awake and alert, interactive, NAD  Eyes: PERRL, no injection or scleral icterus, no discharge  HEENT: NCAT, neck supple, MMM, no oropharyngeal exudates  CV: RRR, no m/r/g  Respiratory: CTAB, no r/r/w  GI: Abd soft, nondistended, nontender, ? R CVA ttp (reports pain but no grimace)  : Deferred  MSK: FROM, no joint effusions or edema  Skin: No rashes  Neuro: CN2-12 intact, symmetric facies, fluent speech. Psych: Well-groomed, normal speech, behavior, appropriate mood    Lab and Diagnostic Study Results     Labs -   No results found for this or any previous visit (from the past 12 hour(s)). Radiologic Studies -   [unfilled]  CT Results  (Last 48 hours)      None          CXR Results  (Last 48 hours)      None            Medical Decision Making and ED Course   - I am the first and primary provider for this patient AND AM THE PRIMARY PROVIDER OF RECORD. - I reviewed the vital signs, available nursing notes, past medical history, past surgical history, family history and social history. - Initial assessment performed. The patients presenting problems have been discussed, and the staff are in agreement with the care plan formulated and outlined with them. I have encouraged them to ask questions as they arise throughout their visit. Vital Signs-Reviewed the patient's vital signs. Patient Vitals for the past 12 hrs:   Temp Pulse Resp BP SpO2   05/24/22 1126 98.3 °F (36.8 °C) 69 16 111/81 97 %       EKG interpretation:         Provider Notes (Medical Decision Making):   76F w/reported CKD vs. CHASIDY. Will get urine sodium/Cr and give empiric 500mL bolus. Bloodwork to confirm and assess if any need for HD or admission at this time vs. Outpatient workup. Dispo pending workup. ED Course:       ED Course as of 05/24/22 1436   Tue May 24, 2022   1213 HGB(!): 10.9 [YA]   1243 Creatinine(!): 2.73  Up from 0.6 prior, likely CHASIDY. Will admit. [YA]   7137 Admitted to Dr. Lora Weldon. [YA]      ED Course User Index  [YA] Maria Isabel Choi MD         Consultations:     Hospitalist Dr. Lora Weldon. Disposition     Disposition: Admitted to Floor Medical Floor the case was discussed with the admitting physician Dr. Lora Weldon. Admitted      Diagnosis     Clinical Impression:   1. Abnormal laboratory test result        Attestations:     Babs Rodriguez MD

## 2022-05-24 NOTE — TELEPHONE ENCOUNTER
Spoke to patient regarding labs, patient in CHASIDY, recommend ER evaluation urgently. Patient will drive herself to [facility].

## 2022-05-24 NOTE — PROGRESS NOTES
Report given to Anibal Arce RN oncoming nurse to include sbar, mar, kardex, recent orders and  changes;

## 2022-05-24 NOTE — H&P
History and Physical    Patient: Rosette Eldridge MRN: [id number]  SSN: [id number]    YOB: 1945  Age: 77 y.o. Sex: female      Subjective:      Rosette Eldridge is a 77 y.o. female who presents with weakness and recurrent episodic (chronic?) diarrhea. Outpatient lab by PCP showed increased Cr (new finding) --> ER. Denies active fever, cp, sob, paresthesias, dizziness, n/v, dysuria. Past Medical History:   Diagnosis Date    Diabetes ([address]     Hypercholesterolemia     Hypertension      Past Surgical History:   Procedure Laterality Date    HX ORTHOPAEDIC  09/2018    Hip repair       Family History   Problem Relation Age of Onset    Diabetes Other     Heart Disease Other     Lung Cancer Other      Social History     Tobacco Use    Smoking status: Never Smoker    Smokeless tobacco: Never Used   Substance Use Topics    Alcohol use: Yes     Comment: occasional       Prior to Admission medications    Medication Sig Start Date End Date Taking? Authorizing Provider   losartan-hydroCHLOROthiazide (HYZAAR) 100-12.5 mg per tablet Take 1 Tablet by mouth daily. 5/23/22  Yes Shante Chaney MD   rOPINIRole (REQUIP) 2 mg tablet Take 1 Tablet by mouth nightly. 5/23/22  Yes Shante Chaney MD   Omeprazole delayed release (PRILOSEC D/R) 20 mg tablet Take 1 Tablet by mouth daily. Indications: gastroesophageal reflux disease 5/23/22  Yes Shante Chnaey MD   meloxicam (MOBIC) 15 mg tablet Take 1 Tablet by mouth daily. 5/23/22  Yes Shante Chaney MD   ezetimibe (ZETIA) 10 mg tablet Take 1 Tablet by mouth daily. 5/23/22  Yes Shante Chaney MD   estradioL (ESTRACE) 0.01 % (0.1 mg/gram) vaginal cream insert 1 gram vaginally three times a week 5/23/22  Yes Shante Chaney MD   cholecalciferol (VITAMIN D3) (1000 Units /25 mcg) tablet Take 1 Tablet by mouth daily. Indications: low vitamin D levels 5/23/22  Yes Shante Chaney MD   mirtazapine (REMERON) 15 mg tablet Take 1 Tablet by mouth nightly.  5/23/22  Yes Kirsten Zhang MD   meclizine (ANTIVERT) 25 mg tablet take 1 tablet by mouth three times a day if needed for dizziness for up to 10 days 5/24/21  Yes Margarita [place], NP        No Known Allergies    Review of Systems:  Constitutional: No fevers, No chills, No night sweats, No fatigue, + weakness, No significant weight change  Eyes: No visual disturbance, No loss of vision  HENT: No nasal congestion, No sore throat, No head lesion, No hearing deficit  Respiratory: No cough, No sputum, No wheezing, No SOB, No hemoptysis, No pleuritic CP  Cardiovascular: No chest pain, No lower extremity edema, No palpitations, No PND, No orthopnea  Gastrointestinal: No nausea, No vomiting, + diarrhea, No constipation, No abdominal pain, No Melena, No hematemesis, No BRBPR  Genitourinary: No frequency, No dysuria, No hematuria  Integument/Breast: No rash, No new skin lesion(s), No dryness, No new palpable nodule  Musculoskeletal: No arthralgias, No neck pain, No back pain, No joint pain, No fall or injury  Neurological: No headaches, No dizziness, No confusion,  No seizures, No focal weakness, No LOC, No paresthesia  Heme/Onc: No overt bleeding noted, No obvious swollen glands  Behavioral/Psychiatric: No change in mood; no SI; no hallucination      Objective:     Vitals:    05/24/22 1302 05/24/22 1349 05/24/22 1416 05/24/22 1600   BP: (!) 141/69 136/81 129/86    Pulse: 69 86 78 94   Resp:  23 24    Temp:       SpO2: 100% 99% 98%    Weight:       Height:            Physical Exam:    General: Alert and responsive, NAD  Head: atraumatic  Eye: No overt orbital findings, PERRLA   ENT: Dry  Neck: Supple, No overt palpable mass, No significant palpable lymph nodes  Vascular: No carotid bruit, palpable pulses bilat  Lung: CTA bilat, vesicular breath sounds  Heart: S1S2, No significant murmur appreciated  Abdomen: Soft, NT, No rigidity, No rebound, Bowel sounds +  Extremities: No edema  M/S: No traumatic change, active mobility noted  Skin: No cyanosis, No rash of significance (other than chronic lesions)  Neurologic: No overt focal motor deficit. Oriented. Psychiatric: Coherent and age appropriate    Recent Results (from the past 24 hour(s))   CBC WITH AUTOMATED DIFF    Collection Time: 05/24/22 11:55 AM   Result Value Ref Range    WBC 12.4 (H) 3.6 - 11.0 K/uL    RBC 3.74 (L) 3.80 - 5.20 M/uL    HGB 10.9 (L) 11.5 - 16.0 g/dL    HCT 33.3 (L) 35.0 - 47.0 %    MCV 89.0 80.0 - 99.0 FL    MCH 29.1 26.0 - 34.0 PG    MCHC 32.7 30.0 - 36.5 g/dL    RDW 13.8 11.5 - 14.5 %    PLATELET 301 (H) 308 - 400 K/uL    MPV 9.8 8.9 - 12.9 FL    NRBC 0.0 0.0  WBC    ABSOLUTE NRBC 0.00 0.00 - 0.01 K/uL    NEUTROPHILS 70 32 - 75 %    LYMPHOCYTES 17 12 - 49 %    MONOCYTES 11 5 - 13 %    EOSINOPHILS 1 0 - 7 %    BASOPHILS 0 0 - 1 %    IMMATURE GRANULOCYTES 1 (H) 0 - 0.5 %    ABS. NEUTROPHILS 8.7 (H) 1.8 - 8.0 K/UL    ABS. LYMPHOCYTES 2.1 0.8 - 3.5 K/UL    ABS. MONOCYTES 1.3 (H) 0.0 - 1.0 K/UL    ABS. EOSINOPHILS 0.1 0.0 - 0.4 K/UL    ABS. BASOPHILS 0.0 0.0 - 0.1 K/UL    ABS. IMM. GRANS. 0.1 (H) 0.00 - 0.04 K/UL    DF AUTOMATED     METABOLIC PANEL, COMPREHENSIVE    Collection Time: 05/24/22 11:55 AM   Result Value Ref Range    Sodium 136 136 - 145 mmol/L    Potassium 3.2 (L) 3.5 - 5.1 mmol/L    Chloride 102 97 - 108 mmol/L    CO2 28 21 - 32 mmol/L    Anion gap 6 5 - 15 mmol/L    Glucose 111 (H) 65 - 100 mg/dL    BUN 26 (H) 6 - 20 mg/dL    Creatinine 2.73 (H) 0.55 - 1.02 mg/dL    BUN/Creatinine ratio 10 (L) 12 - 20      GFR est AA 21 (L) >60 ml/min/1.73m2    GFR est non-AA 17 (L) >60 ml/min/1.73m2    Calcium 9.0 8.5 - 10.1 mg/dL    Bilirubin, total 0.5 0.2 - 1.0 mg/dL    AST (SGOT) 16 15 - 37 U/L    ALT (SGPT) 17 12 - 78 U/L    Alk.  phosphatase 137 (H) 45 - 117 U/L    Protein, total 7.4 6.4 - 8.2 g/dL    Albumin 2.8 (L) 3.5 - 5.0 g/dL    Globulin 4.6 (H) 2.0 - 4.0 g/dL    A-G Ratio 0.6 (L) 1.1 - 2.2     URINALYSIS W/MICROSCOPIC    Collection Time: 05/24/22 11:55 AM Result Value Ref Range    Color Yellow/Straw      Appearance Turbid (A) Clear      Specific gravity 1.008 1.003 - 1.030      pH (UA) 5.0 5.0 - 8.0      Protein Negative Negative mg/dL    Glucose Negative Negative mg/dL    Ketone Negative Negative mg/dL    Bilirubin Negative Negative      Blood Negative Negative      Urobilinogen 0.1 0.1 - 1.0 EU/dL    Nitrites Negative Negative      Leukocyte Esterase Large (A) Negative      WBC 0-4 0 - 4 /hpf    RBC 5-10 0 - 5 /hpf    Bacteria Negative Negative /hpf   NT-PRO BNP    Collection Time: 05/24/22 11:55 AM   Result Value Ref Range    NT pro- <450 pg/mL   SODIUM, UR, RANDOM    Collection Time: 05/24/22 11:55 AM   Result Value Ref Range    Sodium,urine random 38 mmol/L   CREATININE, UR, RANDOM    Collection Time: 05/24/22 11:55 AM   Result Value Ref Range    Creatinine, urine 101.00 mg/dL       Active Problems:    ATN (acute tubular necrosis) ([facility]) (5/24/2022)      UTI (urinary tract infection) (5/24/2022)      CHASIDY (acute kidney injury) ([facility] (5/24/2022)        Assessment/Plan:     CHASIDY (ATN?) due to dehydration + NSAIDs (patient on Mobic at home). Episodic Diarrhea Hx    Mild UTI    => Admit. IVF. C/s Renal. Rocephin. Urine and stool studies. Renal U/S. DC NSAIDs.     DVT Prophylaxis: Lovenox  Code Status: Full      Signed By: Mally Billingsley MD     May 24, 2022

## 2022-05-24 NOTE — ED NOTES
TRANSFER - OUT REPORT:    Verbal report given to king JUAN on Rad Tate  being transferred to [facility] for routine progression of care       Report consisted of patients Situation, Background, Assessment and   Recommendations(SBAR). Information from the following report(s) SBAR, ED Summary, [facility] and Recent Results was reviewed with the receiving nurse. Lines:   Peripheral IV 05/24/22 Right Antecubital (Active)        Opportunity for questions and clarification was provided.       Patient transported with:   Monitor  Tech

## 2022-05-25 ENCOUNTER — APPOINTMENT (OUTPATIENT)
Dept: ULTRASOUND IMAGING | Age: 77
DRG: 640 | End: 2022-05-25
Attending: INTERNAL MEDICINE
Payer: MEDICARE

## 2022-05-25 LAB
ANION GAP SERPL CALC-SCNC: 8 MMOL/L (ref 5–15)
BUN SERPL-MCNC: 23 MG/DL (ref 6–20)
BUN/CREAT SERPL: 11 (ref 12–20)
CA-I BLD-MCNC: 8.6 MG/DL (ref 8.5–10.1)
CHLORIDE SERPL-SCNC: 105 MMOL/L (ref 97–108)
CO2 SERPL-SCNC: 26 MMOL/L (ref 21–32)
CREAT SERPL-MCNC: 2.09 MG/DL (ref 0.55–1.02)
EST. AVERAGE GLUCOSE BLD GHB EST-MCNC: 140 MG/DL
GLUCOSE BLD STRIP.AUTO-MCNC: 108 MG/DL (ref 65–117)
GLUCOSE SERPL-MCNC: 116 MG/DL (ref 65–100)
HBA1C MFR BLD: 6.5 % (ref 4–5.6)
PERFORMED BY, TECHID: NORMAL
POTASSIUM SERPL-SCNC: 3.2 MMOL/L (ref 3.5–5.1)
SODIUM SERPL-SCNC: 139 MMOL/L (ref 136–145)

## 2022-05-25 PROCEDURE — 83036 HEMOGLOBIN GLYCOSYLATED A1C: CPT

## 2022-05-25 PROCEDURE — 76770 US EXAM ABDO BACK WALL COMP: CPT

## 2022-05-25 PROCEDURE — 82962 GLUCOSE BLOOD TEST: CPT

## 2022-05-25 PROCEDURE — 65270000029 HC RM PRIVATE

## 2022-05-25 PROCEDURE — 74011250636 HC RX REV CODE- 250/636: Performed by: INTERNAL MEDICINE

## 2022-05-25 PROCEDURE — 36415 COLL VENOUS BLD VENIPUNCTURE: CPT

## 2022-05-25 PROCEDURE — 74011000250 HC RX REV CODE- 250: Performed by: INTERNAL MEDICINE

## 2022-05-25 PROCEDURE — 80048 BASIC METABOLIC PNL TOTAL CA: CPT

## 2022-05-25 PROCEDURE — 74011250637 HC RX REV CODE- 250/637: Performed by: INTERNAL MEDICINE

## 2022-05-25 PROCEDURE — 87086 URINE CULTURE/COLONY COUNT: CPT

## 2022-05-25 RX ADMIN — SODIUM CHLORIDE, PRESERVATIVE FREE 10 ML: 5 INJECTION INTRAVENOUS at 22:09

## 2022-05-25 RX ADMIN — SODIUM CHLORIDE, PRESERVATIVE FREE 10 ML: 5 INJECTION INTRAVENOUS at 15:30

## 2022-05-25 RX ADMIN — SODIUM CHLORIDE 75 ML/HR: 9 INJECTION, SOLUTION INTRAVENOUS at 15:33

## 2022-05-25 RX ADMIN — ROPINIROLE HYDROCHLORIDE 2 MG: 1 TABLET, FILM COATED ORAL at 22:09

## 2022-05-25 RX ADMIN — MIRTAZAPINE 15 MG: 15 TABLET, FILM COATED ORAL at 22:09

## 2022-05-25 RX ADMIN — PANTOPRAZOLE SODIUM 40 MG: 20 TABLET, DELAYED RELEASE ORAL at 11:09

## 2022-05-25 RX ADMIN — WATER 1 G: 1 INJECTION INTRAMUSCULAR; INTRAVENOUS; SUBCUTANEOUS at 18:27

## 2022-05-25 RX ADMIN — SODIUM CHLORIDE, PRESERVATIVE FREE 10 ML: 5 INJECTION INTRAVENOUS at 05:02

## 2022-05-25 RX ADMIN — ENOXAPARIN SODIUM 30 MG: 100 INJECTION SUBCUTANEOUS at 11:09

## 2022-05-25 NOTE — PROGRESS NOTES
Comprehensive Nutrition Assessment    Type and Reason for Visit: (P) Positive nutrition screen (MST3)    Nutrition Recommendations/Plan:   Continue current diet  Continue ensure enlive 3x/day  Monitor and record PO intakes, supplement acceptance, and Bms in I/Os     Malnutrition Assessment:  Malnutrition Status:  Mild malnutrition (05/25/22 1224)    Context:  Chronic illness     Findings of the 6 clinical characteristics of malnutrition:   Energy Intake:  Mild decrease in energy intake (specify)  Weight Loss:  10% over 6 months     Body Fat Loss:  No significant body fat loss,     Muscle Mass Loss:  No significant muscle mass loss,    Fluid Accumulation:  No significant fluid accumulation,     Strength:  Not performed     Nutrition Assessment:    (P) Admitted for ATN, increased Cr at PCP. MST3 for decreased appetite, weight loss. Weight loss per chart review, 10% x6mo. Noted episodic diarrhea, last episode 2 weeks ago. Reports altered taste, but appetite currently good, >50% of meals. ONS as ordered appropriate to supplement PO intake. Labs: Na 139, K 3.2, BUN 23, Creat 2.09, Gluc 116, Alb 2.8. Meds: sodium chloride, mirtazapine. Nutrition Related Findings:    (P) Nourished per NFPE, No n/v or problems chewing swallowing. +chronic diarrhea, last episode 2 weeks ago. BM 5/24. No edema. Wound Type: (P) None    Current Nutrition Intake & Therapies:  Average Meal Intake: (P) 51-75%  Average Supplement Intake: (P) Unable to assess  ADULT ORAL NUTRITION SUPPLEMENT Breakfast, Lunch, Dinner; Standard High Calorie/High Protein  ADULT DIET Regular; 4 carb choices (60 gm/meal); Low Sodium (2 gm); Diabetic diet, sugar substitute, no juice, diet soda    Anthropometric Measures:  Height: (P) 5' 2.01\" (157.5 cm)  Ideal Body Weight (IBW): (P) 110 lbs ((P) 50 kg)  Current Body Wt:  (P) 65.3 kg (143 lb 15.4 oz), (P) 130.9 % IBW.  (P) Not specified  Current BMI (kg/m2): (P) 26.3  Weight Adjustment: (P) No adjustment  BMI Category: (P) Overweight (BMI 25.0-29. 9)    Estimated Daily Nutrient Needs:  Energy Requirements Based On: (P) Kcal/kg  Weight Used for Energy Requirements: (P) Current  Energy (kcal/day): (P) 1633kcal (25kcal/kg)  Weight Used for Protein Requirements: (P) Current  Protein (g/day): (P) 65g (1g/kg)  Method Used for Fluid Requirements: (P) 1 ml/kcal  Fluid (ml/day): (P) 1633mL (1mL/kcal)    Nutrition Diagnosis:   (P) Unintended weight loss related to (P) inadequate protein-energy intake as evidenced by (P) poor intake prior to admission,weight loss greater than or equal to 10% in 6 months,diarrhea    Nutrition Interventions:   Food and/or Nutrient Delivery: (P) Continue current diet,Continue oral nutrition supplement  Nutrition Education/Counseling: (P) No recommendations at this time  Coordination of Nutrition Care: (P) Continue to monitor while inpatient,Feeding assistance/environmental change    Goals:  Goals: (P) PO intake 50% or greater,by next RD assessment    Nutrition Monitoring and Evaluation:   Behavioral-Environmental Outcomes: (P) None identified  Food/Nutrient Intake Outcomes: (P) Food and nutrient intake,Supplement intake  Physical Signs/Symptoms Outcomes: (P) Diarrhea,Meal time behavior,Weight    Discharge Planning:    (P) Too soon to determine    Jesse Mullen RD  Contact: [phone]

## 2022-05-25 NOTE — PROGRESS NOTES
Hospitalist Progress Note    Subjective:   Daily Progress Note: 5/25/2022 10:13 AM    Patient is feeling overall better. She feels less weak more alert and has not had diarrhea today. Her diarrhea has been sharron colored without pain with defecation or melena for the past 3 days. Patient denies recent antibiotic use. Patient also admits that food does not taste the same but appetite is good. Patient denies known COVID infection. Patient is vaccinated x2. Current Facility-Administered Medications   Medication Dose Route Frequency    sodium chloride (NS) flush 5-40 mL  5-40 mL IntraVENous Q8H    sodium chloride (NS) flush 5-40 mL  5-40 mL IntraVENous PRN    acetaminophen (TYLENOL) tablet 650 mg  650 mg Oral Q6H PRN    Or    acetaminophen (TYLENOL) suppository 650 mg  650 mg Rectal Q6H PRN    polyethylene glycol (MIRALAX) packet 17 g  17 g Oral DAILY PRN    ondansetron (ZOFRAN ODT) tablet 4 mg  4 mg Oral Q8H PRN    Or    ondansetron (ZOFRAN) injection 4 mg  4 mg IntraVENous Q6H PRN    enoxaparin (LOVENOX) injection 30 mg  30 mg SubCUTAneous DAILY    mirtazapine (REMERON) tablet 15 mg  15 mg Oral QHS    pantoprazole (PROTONIX) tablet 40 mg  40 mg Oral ACB    rOPINIRole (REQUIP) tablet 2 mg  2 mg Oral QHS    cefTRIAXone (ROCEPHIN) 1 g in sterile water (preservative free) 10 mL IV syringe  1 g IntraVENous Q24H    0.9% sodium chloride infusion  75 mL/hr IntraVENous CONTINUOUS        Review of Systems  Review of Systems   Constitutional: Negative. Respiratory: Negative. Cardiovascular: Negative. Gastrointestinal: Positive for abdominal pain and diarrhea. Negative for blood in stool, nausea and vomiting. Genitourinary: Negative for dysuria. Neurological: Positive for weakness. All other systems reviewed and are negative.        Objective:     Visit Vitals  /66   Pulse 69   Temp 98.8 °F (37.1 °C)   Resp 20   Ht 5' 2\" (1.575 m)   Wt 65.3 kg (144 lb)   SpO2 94%   BMI 26.34 kg/m²      O2 Device: None (Room air)    Temp (24hrs), Av.6 °F (37 °C), Min:98.3 °F (36.8 °C), Max:98.8 °F (37.1 °C)      No intake/output data recorded. No intake/output data recorded. Recent Results (from the past 24 hour(s))   CBC WITH AUTOMATED DIFF    Collection Time: 22 11:55 AM   Result Value Ref Range    WBC 12.4 (H) 3.6 - 11.0 K/uL    RBC 3.74 (L) 3.80 - 5.20 M/uL    HGB 10.9 (L) 11.5 - 16.0 g/dL    HCT 33.3 (L) 35.0 - 47.0 %    MCV 89.0 80.0 - 99.0 FL    MCH 29.1 26.0 - 34.0 PG    MCHC 32.7 30.0 - 36.5 g/dL    RDW 13.8 11.5 - 14.5 %    PLATELET 760 (H) 743 - 400 K/uL    MPV 9.8 8.9 - 12.9 FL    NRBC 0.0 0.0  WBC    ABSOLUTE NRBC 0.00 0.00 - 0.01 K/uL    NEUTROPHILS 70 32 - 75 %    LYMPHOCYTES 17 12 - 49 %    MONOCYTES 11 5 - 13 %    EOSINOPHILS 1 0 - 7 %    BASOPHILS 0 0 - 1 %    IMMATURE GRANULOCYTES 1 (H) 0 - 0.5 %    ABS. NEUTROPHILS 8.7 (H) 1.8 - 8.0 K/UL    ABS. LYMPHOCYTES 2.1 0.8 - 3.5 K/UL    ABS. MONOCYTES 1.3 (H) 0.0 - 1.0 K/UL    ABS. EOSINOPHILS 0.1 0.0 - 0.4 K/UL    ABS. BASOPHILS 0.0 0.0 - 0.1 K/UL    ABS. IMM. GRANS. 0.1 (H) 0.00 - 0.04 K/UL    DF AUTOMATED     METABOLIC PANEL, COMPREHENSIVE    Collection Time: 22 11:55 AM   Result Value Ref Range    Sodium 136 136 - 145 mmol/L    Potassium 3.2 (L) 3.5 - 5.1 mmol/L    Chloride 102 97 - 108 mmol/L    CO2 28 21 - 32 mmol/L    Anion gap 6 5 - 15 mmol/L    Glucose 111 (H) 65 - 100 mg/dL    BUN 26 (H) 6 - 20 mg/dL    Creatinine 2.73 (H) 0.55 - 1.02 mg/dL    BUN/Creatinine ratio 10 (L) 12 - 20      GFR est AA 21 (L) >60 ml/min/1.73m2    GFR est non-AA 17 (L) >60 ml/min/1.73m2    Calcium 9.0 8.5 - 10.1 mg/dL    Bilirubin, total 0.5 0.2 - 1.0 mg/dL    AST (SGOT) 16 15 - 37 U/L    ALT (SGPT) 17 12 - 78 U/L    Alk.  phosphatase 137 (H) 45 - 117 U/L    Protein, total 7.4 6.4 - 8.2 g/dL    Albumin 2.8 (L) 3.5 - 5.0 g/dL    Globulin 4.6 (H) 2.0 - 4.0 g/dL    A-G Ratio 0.6 (L) 1.1 - 2.2     URINALYSIS W/MICROSCOPIC    Collection Time: 22 11:55 AM   Result Value Ref Range    Color Yellow/Straw      Appearance Turbid (A) Clear      Specific gravity 1.008 1.003 - 1.030      pH (UA) 5.0 5.0 - 8.0      Protein Negative Negative mg/dL    Glucose Negative Negative mg/dL    Ketone Negative Negative mg/dL    Bilirubin Negative Negative      Blood Negative Negative      Urobilinogen 0.1 0.1 - 1.0 EU/dL    Nitrites Negative Negative      Leukocyte Esterase Large (A) Negative      WBC 0-4 0 - 4 /hpf    RBC 5-10 0 - 5 /hpf    Bacteria Negative Negative /hpf   NT-PRO BNP    Collection Time: 05/24/22 11:55 AM   Result Value Ref Range    NT pro- <450 pg/mL   SODIUM, UR, RANDOM    Collection Time: 05/24/22 11:55 AM   Result Value Ref Range    Sodium,urine random 38 mmol/L   CREATININE, UR, RANDOM    Collection Time: 05/24/22 11:55 AM   Result Value Ref Range    Creatinine, urine 092.41 mg/dL   METABOLIC PANEL, BASIC    Collection Time: 05/25/22  7:50 AM   Result Value Ref Range    Sodium 139 136 - 145 mmol/L    Potassium 3.2 (L) 3.5 - 5.1 mmol/L    Chloride 105 97 - 108 mmol/L    CO2 26 21 - 32 mmol/L    Anion gap 8 5 - 15 mmol/L    Glucose 116 (H) 65 - 100 mg/dL    BUN 23 (H) 6 - 20 mg/dL    Creatinine 2.09 (H) 0.55 - 1.02 mg/dL    BUN/Creatinine ratio 11 (L) 12 - 20      GFR est AA 28 (L) >60 ml/min/1.73m2    GFR est non-AA 23 (L) >60 ml/min/1.73m2    Calcium 8.6 8.5 - 10.1 mg/dL        US RETROPERITONEUM COMP    (Results Pending)        PHYSICAL EXAM:    Physical Exam  Vitals and nursing note reviewed. Constitutional:       Appearance: She is obese. She is ill-appearing. HENT:      Head: Normocephalic and atraumatic. Mouth/Throat:      Mouth: Mucous membranes are moist.   Cardiovascular:      Rate and Rhythm: Normal rate and regular rhythm. Pulmonary:      Effort: No respiratory distress. Breath sounds: No wheezing. Abdominal:      General: Bowel sounds are normal. There is no distension. Palpations: Abdomen is soft.       Tenderness: There is abdominal tenderness. Comments: Mild tenderness to palpation of left lower quadrant   Genitourinary:     Comments: No Rangel present  Musculoskeletal:      Right lower leg: No edema. Left lower leg: No edema. Skin:     General: Skin is warm. Capillary Refill: Capillary refill takes less than 2 seconds. Neurological:      Mental Status: She is alert and oriented to person, place, and time. Psychiatric:         Mood and Affect: Mood normal.          Data Review    Recent Results (from the past 24 hour(s))   CBC WITH AUTOMATED DIFF    Collection Time: 05/24/22 11:55 AM   Result Value Ref Range    WBC 12.4 (H) 3.6 - 11.0 K/uL    RBC 3.74 (L) 3.80 - 5.20 M/uL    HGB 10.9 (L) 11.5 - 16.0 g/dL    HCT 33.3 (L) 35.0 - 47.0 %    MCV 89.0 80.0 - 99.0 FL    MCH 29.1 26.0 - 34.0 PG    MCHC 32.7 30.0 - 36.5 g/dL    RDW 13.8 11.5 - 14.5 %    PLATELET 005 (H) 855 - 400 K/uL    MPV 9.8 8.9 - 12.9 FL    NRBC 0.0 0.0  WBC    ABSOLUTE NRBC 0.00 0.00 - 0.01 K/uL    NEUTROPHILS 70 32 - 75 %    LYMPHOCYTES 17 12 - 49 %    MONOCYTES 11 5 - 13 %    EOSINOPHILS 1 0 - 7 %    BASOPHILS 0 0 - 1 %    IMMATURE GRANULOCYTES 1 (H) 0 - 0.5 %    ABS. NEUTROPHILS 8.7 (H) 1.8 - 8.0 K/UL    ABS. LYMPHOCYTES 2.1 0.8 - 3.5 K/UL    ABS. MONOCYTES 1.3 (H) 0.0 - 1.0 K/UL    ABS. EOSINOPHILS 0.1 0.0 - 0.4 K/UL    ABS. BASOPHILS 0.0 0.0 - 0.1 K/UL    ABS. IMM.  GRANS. 0.1 (H) 0.00 - 0.04 K/UL    DF AUTOMATED     METABOLIC PANEL, COMPREHENSIVE    Collection Time: 05/24/22 11:55 AM   Result Value Ref Range    Sodium 136 136 - 145 mmol/L    Potassium 3.2 (L) 3.5 - 5.1 mmol/L    Chloride 102 97 - 108 mmol/L    CO2 28 21 - 32 mmol/L    Anion gap 6 5 - 15 mmol/L    Glucose 111 (H) 65 - 100 mg/dL    BUN 26 (H) 6 - 20 mg/dL    Creatinine 2.73 (H) 0.55 - 1.02 mg/dL    BUN/Creatinine ratio 10 (L) 12 - 20      GFR est AA 21 (L) >60 ml/min/1.73m2    GFR est non-AA 17 (L) >60 ml/min/1.73m2    Calcium 9.0 8.5 - 10.1 mg/dL    Bilirubin, total 0.5 0.2 - 1.0 mg/dL    AST (SGOT) 16 15 - 37 U/L    ALT (SGPT) 17 12 - 78 U/L    Alk.  phosphatase 137 (H) 45 - 117 U/L    Protein, total 7.4 6.4 - 8.2 g/dL    Albumin 2.8 (L) 3.5 - 5.0 g/dL    Globulin 4.6 (H) 2.0 - 4.0 g/dL    A-G Ratio 0.6 (L) 1.1 - 2.2     URINALYSIS W/MICROSCOPIC    Collection Time: 05/24/22 11:55 AM   Result Value Ref Range    Color Yellow/Straw      Appearance Turbid (A) Clear      Specific gravity 1.008 1.003 - 1.030      pH (UA) 5.0 5.0 - 8.0      Protein Negative Negative mg/dL    Glucose Negative Negative mg/dL    Ketone Negative Negative mg/dL    Bilirubin Negative Negative      Blood Negative Negative      Urobilinogen 0.1 0.1 - 1.0 EU/dL    Nitrites Negative Negative      Leukocyte Esterase Large (A) Negative      WBC 0-4 0 - 4 /hpf    RBC 5-10 0 - 5 /hpf    Bacteria Negative Negative /hpf   NT-PRO BNP    Collection Time: 05/24/22 11:55 AM   Result Value Ref Range    NT pro- <450 pg/mL   SODIUM, UR, RANDOM    Collection Time: 05/24/22 11:55 AM   Result Value Ref Range    Sodium,urine random 38 mmol/L   CREATININE, UR, RANDOM    Collection Time: 05/24/22 11:55 AM   Result Value Ref Range    Creatinine, urine 964.90 mg/dL   METABOLIC PANEL, BASIC    Collection Time: 05/25/22  7:50 AM   Result Value Ref Range    Sodium 139 136 - 145 mmol/L    Potassium 3.2 (L) 3.5 - 5.1 mmol/L    Chloride 105 97 - 108 mmol/L    CO2 26 21 - 32 mmol/L    Anion gap 8 5 - 15 mmol/L    Glucose 116 (H) 65 - 100 mg/dL    BUN 23 (H) 6 - 20 mg/dL    Creatinine 2.09 (H) 0.55 - 1.02 mg/dL    BUN/Creatinine ratio 11 (L) 12 - 20      GFR est AA 28 (L) >60 ml/min/1.73m2    GFR est non-AA 23 (L) >60 ml/min/1.73m2    Calcium 8.6 8.5 - 10.1 mg/dL        Assessment/Plan:     Active Problems:    ATN (acute tubular necrosis) ([facility]) (5/24/2022)      UTI (urinary tract infection) (5/24/2022)      CHASIDY (acute kidney injury) ([facility] [address] (5/24/2022)        Hospital Course:    Benita Berrios is a 77year old female with a PMH of diabetes, hypercholesteremia, and hypertension who presented with weakness and diarrhea. Vital signs in ED unremarkable. Initial labs significant for white blood cell count of 12.4, hemoglobin of 10.9, potassium of 3.2, creatinine of 2.73, BUN of 10, and alk phos of 137. UA positive for leuk esterase and hematuria. Patient to be admitted for further work-up. Patient started on IV fluids and Rocephin. Nephrology consulted. Urine culture pending. Stool studies pending. Retroperitoneal ultrasound pending. Acute kidney injury  Secondary to dehydration and NSAIDs  Creatinine improving, will trend  Continue on IV fluids   Renal ultrasound pending  Nephrology following    Chronic diarrhea  Stool studies pending    Urinary tract infection  Continue on ceftriaxone    Diabetes mellitus, type II  A1c pending  Sliding scale insulin as needed    DVT Prophylaxis: lovenox  GI Prophylaxis: protonix  Discharge and disposition barriers: IV abx, stool studies, nephro consult, 24 to 48 hours    Care Plan discussed with: Patient/Family, Nurse and     Total time spent with patient: 35 minutes.

## 2022-05-25 NOTE — CONSULTS
Renal Consult Note    Admit Date: 5/24/2022      HPI:   77 yr old lady was admitted with weakness. Her creatinine on admission was 3.03 mg. Her baseline creatinine is 0.66 mg. She was started on IV fluids and today her creatinine is 2.09 mg. She has had diarrhea and she was on hydrochlorothiazide at home as well as part of her antihypertensive regimen. She has also been taking Mobic for joint pain. She has had hypertension for a while. Diabetes has been diet controlled. She has had diarrhea which is pasty. This is chronic. There is no history of kidney stones. She complains of dizziness. She sleeps on  2 pillows and denies orthopnea or PND.       Current Facility-Administered Medications   Medication Dose Route Frequency    sodium chloride (NS) flush 5-40 mL  5-40 mL IntraVENous Q8H    sodium chloride (NS) flush 5-40 mL  5-40 mL IntraVENous PRN    acetaminophen (TYLENOL) tablet 650 mg  650 mg Oral Q6H PRN    Or    acetaminophen (TYLENOL) suppository 650 mg  650 mg Rectal Q6H PRN    polyethylene glycol (MIRALAX) packet 17 g  17 g Oral DAILY PRN    ondansetron (ZOFRAN ODT) tablet 4 mg  4 mg Oral Q8H PRN    Or    ondansetron (ZOFRAN) injection 4 mg  4 mg IntraVENous Q6H PRN    enoxaparin (LOVENOX) injection 30 mg  30 mg SubCUTAneous DAILY    mirtazapine (REMERON) tablet 15 mg  15 mg Oral QHS    pantoprazole (PROTONIX) tablet 40 mg  40 mg Oral ACB    rOPINIRole (REQUIP) tablet 2 mg  2 mg Oral QHS    cefTRIAXone (ROCEPHIN) 1 g in sterile water (preservative free) 10 mL IV syringe  1 g IntraVENous Q24H    0.9% sodium chloride infusion  75 mL/hr IntraVENous CONTINUOUS        Past Medical History:   Diagnosis Date    [facility] [address]     Hypercholesterolemia     Hypertension       Past Surgical History:   Procedure Laterality Date    HX ORTHOPAEDIC  09/2018    Hip repair      Family History   Problem Relation Age of Onset    Diabetes Other     Heart Disease Other     Lung Cancer Other Social History     Tobacco Use    Smoking status: Never Smoker    Smokeless tobacco: Never Used   Substance Use Topics    Alcohol use: Yes     Comment: occasional          Review of Systems    Review of Systems   Constitutional: Negative for fever. Respiratory: Negative for cough and shortness of breath. Cardiovascular: Negative for chest pain and leg swelling. Gastrointestinal: Negative for abdominal pain. Neurological: Positive for dizziness. Negative for headaches. Physical Exam:     Physical Exam  Cardiovascular:      Rate and Rhythm: Regular rhythm. Pulmonary:      Breath sounds: Normal breath sounds. Abdominal:      General: Bowel sounds are normal.      Palpations: Abdomen is soft. Neurological:      Mental Status: She is alert. No asterixis      Patient Vitals for the past 8 hrs:   BP Temp Pulse Resp SpO2 Height   05/25/22 1447 (!) 157/75 97.5 °F (36.4 °C) 83 18 97 % --   05/25/22 1225 -- -- -- -- -- 5' 2.01\" (1.575 m)   05/25/22 1200 -- -- 80 -- -- --   05/25/22 1026 135/60 98.6 °F (37 °C) 83 19 98 % --   05/25/22 0800 -- -- 80 -- -- --     No intake/output data recorded. No intake/output data recorded. US RETROPERITONEUM COMP   Final Result   No hydronephrosis. Data Review   Recent Labs     05/24/22  1155 05/23/22  1011   WBC 12.4* 11.7*   HGB 10.9* 11.3   HCT 33.3* 34.6   * 487*     Recent Labs     05/25/22  0750 05/24/22  1155 05/23/22  1011    136 137   K 3.2* 3.2* 3.8    102 96   CO2 26 28 22   * 111* 115*   BUN 23* 26* 24   CREA 2.09* 2.73* 3.03*   CA 8.6 9.0 9.0   ALB  --  2.8* 3.5*   ALT  --  17 11     No components found for: GLPOC  No results for input(s): PH, PCO2, PO2, HCO3, FIO2 in the last 72 hours. No results for input(s): INR, INREXT, INREXT in the last 72 hours.       Assessment:           Active Problems:    ATN (acute tubular necrosis) ([facility]) (5/24/2022)      UTI (urinary tract infection) (5/24/2022) CHASIDY (acute kidney injury) ([facility] [address] (5/24/2022)    Acute kidney injury due to volume contraction caused by hydrochlorothiazide and NSAID nephropathy. The renal function is improving with hydration. Hypertension    Modest hypokalemia    Possible urinary tract infection    Plan:   Continue IVF  Agree with stopping Mobic  Could restart losartan without hydrochlorothiazide  Check a urine culture. Note lesion at the base of the bladder seen on ultrasound. Defer to primary service regarding urological evaluation.     Thank you for this consult

## 2022-05-25 NOTE — PROGRESS NOTES
Problem: Falls - Risk of  Goal: *Absence of Falls  Description: Document Cruzito Wilkes Fall Risk and appropriate interventions in the flowsheet.   Outcome: Progressing Towards Goal  Note: Fall Risk Interventions:            Medication Interventions: Evaluate medications/consider consulting pharmacy                   Problem: Patient Education: Go to Patient Education Activity  Goal: Patient/Family Education  Outcome: Progressing Towards Goal

## 2022-05-26 PROBLEM — Z96.0 VAGINAL PESSARY PRESENT: Status: ACTIVE | Noted: 2022-05-26

## 2022-05-26 PROBLEM — N81.89 OTHER FEMALE GENITAL PROLAPSE: Status: ACTIVE | Noted: 2022-05-26

## 2022-05-26 LAB
ALBUMIN SERPL-MCNC: 2.4 G/DL (ref 3.5–5)
ALBUMIN/GLOB SERPL: 0.5 {RATIO} (ref 1.1–2.2)
ALP SERPL-CCNC: 107 U/L (ref 45–117)
ALT SERPL-CCNC: 12 U/L (ref 12–78)
ANION GAP SERPL CALC-SCNC: 6 MMOL/L (ref 5–15)
AST SERPL W P-5'-P-CCNC: 14 U/L (ref 15–37)
BASOPHILS # BLD: 0 K/UL (ref 0–0.1)
BASOPHILS NFR BLD: 0 % (ref 0–1)
BILIRUB SERPL-MCNC: 0.2 MG/DL (ref 0.2–1)
BUN SERPL-MCNC: 14 MG/DL (ref 6–20)
BUN/CREAT SERPL: 10 (ref 12–20)
CA-I BLD-MCNC: 8.4 MG/DL (ref 8.5–10.1)
CHLORIDE SERPL-SCNC: 109 MMOL/L (ref 97–108)
CO2 SERPL-SCNC: 26 MMOL/L (ref 21–32)
CREAT SERPL-MCNC: 1.37 MG/DL (ref 0.55–1.02)
DIFFERENTIAL METHOD BLD: ABNORMAL
EOSINOPHIL # BLD: 0.3 K/UL (ref 0–0.4)
EOSINOPHIL NFR BLD: 3 % (ref 0–7)
ERYTHROCYTE [DISTWIDTH] IN BLOOD BY AUTOMATED COUNT: 13.8 % (ref 11.5–14.5)
GLOBULIN SER CALC-MCNC: 4.4 G/DL (ref 2–4)
GLUCOSE SERPL-MCNC: 104 MG/DL (ref 65–100)
HCT VFR BLD AUTO: 30.2 % (ref 35–47)
HGB BLD-MCNC: 9.7 G/DL (ref 11.5–16)
IMM GRANULOCYTES # BLD AUTO: 0.1 K/UL (ref 0–0.04)
IMM GRANULOCYTES NFR BLD AUTO: 1 % (ref 0–0.5)
LYMPHOCYTES # BLD: 1.8 K/UL (ref 0.8–3.5)
LYMPHOCYTES NFR BLD: 20 % (ref 12–49)
MCH RBC QN AUTO: 29 PG (ref 26–34)
MCHC RBC AUTO-ENTMCNC: 32.1 G/DL (ref 30–36.5)
MCV RBC AUTO: 90.1 FL (ref 80–99)
MONOCYTES # BLD: 0.9 K/UL (ref 0–1)
MONOCYTES NFR BLD: 10 % (ref 5–13)
NEUTS SEG # BLD: 6.1 K/UL (ref 1.8–8)
NEUTS SEG NFR BLD: 66 % (ref 32–75)
NRBC # BLD: 0 K/UL (ref 0–0.01)
NRBC BLD-RTO: 0 PER 100 WBC
PLATELET # BLD AUTO: 448 K/UL (ref 150–400)
PMV BLD AUTO: 9.7 FL (ref 8.9–12.9)
POTASSIUM SERPL-SCNC: 3.2 MMOL/L (ref 3.5–5.1)
PROT SERPL-MCNC: 6.8 G/DL (ref 6.4–8.2)
RBC # BLD AUTO: 3.35 M/UL (ref 3.8–5.2)
SODIUM SERPL-SCNC: 141 MMOL/L (ref 136–145)
WBC # BLD AUTO: 9.1 K/UL (ref 3.6–11)

## 2022-05-26 PROCEDURE — 80053 COMPREHEN METABOLIC PANEL: CPT

## 2022-05-26 PROCEDURE — 74011250636 HC RX REV CODE- 250/636: Performed by: STUDENT IN AN ORGANIZED HEALTH CARE EDUCATION/TRAINING PROGRAM

## 2022-05-26 PROCEDURE — 74011000250 HC RX REV CODE- 250: Performed by: INTERNAL MEDICINE

## 2022-05-26 PROCEDURE — 36415 COLL VENOUS BLD VENIPUNCTURE: CPT

## 2022-05-26 PROCEDURE — 74011250637 HC RX REV CODE- 250/637: Performed by: INTERNAL MEDICINE

## 2022-05-26 PROCEDURE — 99221 1ST HOSP IP/OBS SF/LOW 40: CPT | Performed by: OBSTETRICS & GYNECOLOGY

## 2022-05-26 PROCEDURE — 65270000029 HC RM PRIVATE

## 2022-05-26 PROCEDURE — 74011250636 HC RX REV CODE- 250/636: Performed by: INTERNAL MEDICINE

## 2022-05-26 PROCEDURE — 94761 N-INVAS EAR/PLS OXIMETRY MLT: CPT

## 2022-05-26 PROCEDURE — 85025 COMPLETE CBC W/AUTO DIFF WBC: CPT

## 2022-05-26 RX ORDER — LOSARTAN POTASSIUM 50 MG/1
50 TABLET ORAL DAILY
Status: DISCONTINUED | OUTPATIENT
Start: 2022-05-26 | End: 2022-05-27 | Stop reason: HOSPADM

## 2022-05-26 RX ORDER — POTASSIUM CHLORIDE 7.45 MG/ML
10 INJECTION INTRAVENOUS
Status: COMPLETED | OUTPATIENT
Start: 2022-05-26 | End: 2022-05-26

## 2022-05-26 RX ORDER — ENOXAPARIN SODIUM 100 MG/ML
40 INJECTION SUBCUTANEOUS EVERY 24 HOURS
Status: DISCONTINUED | OUTPATIENT
Start: 2022-05-27 | End: 2022-05-27 | Stop reason: HOSPADM

## 2022-05-26 RX ADMIN — SODIUM CHLORIDE, PRESERVATIVE FREE 10 ML: 5 INJECTION INTRAVENOUS at 13:04

## 2022-05-26 RX ADMIN — WATER 1 G: 1 INJECTION INTRAMUSCULAR; INTRAVENOUS; SUBCUTANEOUS at 18:20

## 2022-05-26 RX ADMIN — LOSARTAN POTASSIUM 50 MG: 50 TABLET, FILM COATED ORAL at 11:16

## 2022-05-26 RX ADMIN — SODIUM CHLORIDE 75 ML/HR: 9 INJECTION, SOLUTION INTRAVENOUS at 03:30

## 2022-05-26 RX ADMIN — POTASSIUM CHLORIDE 10 MEQ: 7.46 INJECTION, SOLUTION INTRAVENOUS at 13:01

## 2022-05-26 RX ADMIN — POTASSIUM CHLORIDE 10 MEQ: 7.46 INJECTION, SOLUTION INTRAVENOUS at 11:14

## 2022-05-26 RX ADMIN — POTASSIUM CHLORIDE 10 MEQ: 7.46 INJECTION, SOLUTION INTRAVENOUS at 14:00

## 2022-05-26 RX ADMIN — ENOXAPARIN SODIUM 30 MG: 100 INJECTION SUBCUTANEOUS at 08:41

## 2022-05-26 RX ADMIN — MIRTAZAPINE 15 MG: 15 TABLET, FILM COATED ORAL at 21:05

## 2022-05-26 RX ADMIN — PANTOPRAZOLE SODIUM 40 MG: 20 TABLET, DELAYED RELEASE ORAL at 08:41

## 2022-05-26 RX ADMIN — SODIUM CHLORIDE, PRESERVATIVE FREE 10 ML: 5 INJECTION INTRAVENOUS at 06:00

## 2022-05-26 RX ADMIN — SODIUM CHLORIDE, PRESERVATIVE FREE 10 ML: 5 INJECTION INTRAVENOUS at 21:05

## 2022-05-26 RX ADMIN — POTASSIUM CHLORIDE 10 MEQ: 7.46 INJECTION, SOLUTION INTRAVENOUS at 09:41

## 2022-05-26 RX ADMIN — ROPINIROLE HYDROCHLORIDE 2 MG: 1 TABLET, FILM COATED ORAL at 21:05

## 2022-05-26 NOTE — ROUTINE PROCESS
Bedside and Verbal shift change report given to [facility] (oncoming nurse) by Juan Fontenot (offgoing nurse). Report included the following information SBAR and MAR.

## 2022-05-26 NOTE — PROGRESS NOTES
Renal Daily Progress Note    Admit Date: 5/24/2022      Subjective:   She feels well today. She denies dizziness. She has had 1 bowel movement today. Potassium was 3.2 mEq today and she has been given IV potassium. Appetite is good. No cramps. No shortness of breath. Current Facility-Administered Medications   Medication Dose Route Frequency    losartan (COZAAR) tablet 50 mg  50 mg Oral DAILY    sodium chloride (NS) flush 5-40 mL  5-40 mL IntraVENous Q8H    sodium chloride (NS) flush 5-40 mL  5-40 mL IntraVENous PRN    acetaminophen (TYLENOL) tablet 650 mg  650 mg Oral Q6H PRN    Or    acetaminophen (TYLENOL) suppository 650 mg  650 mg Rectal Q6H PRN    polyethylene glycol (MIRALAX) packet 17 g  17 g Oral DAILY PRN    ondansetron (ZOFRAN ODT) tablet 4 mg  4 mg Oral Q8H PRN    Or    ondansetron (ZOFRAN) injection 4 mg  4 mg IntraVENous Q6H PRN    enoxaparin (LOVENOX) injection 30 mg  30 mg SubCUTAneous DAILY    mirtazapine (REMERON) tablet 15 mg  15 mg Oral QHS    pantoprazole (PROTONIX) tablet 40 mg  40 mg Oral ACB    rOPINIRole (REQUIP) tablet 2 mg  2 mg Oral QHS    cefTRIAXone (ROCEPHIN) 1 g in sterile water (preservative free) 10 mL IV syringe  1 g IntraVENous Q24H    0.9% sodium chloride infusion  75 mL/hr IntraVENous CONTINUOUS        Review of Systems    Review of Systems   Constitutional: Negative for fever. Respiratory: Negative for shortness of breath. Cardiovascular: Negative for chest pain. Gastrointestinal: Negative for abdominal pain. Neurological: Negative for dizziness and headaches. Objective:     Patient Vitals for the past 8 hrs:   BP Temp Pulse Resp SpO2   05/26/22 1502 (!) 153/85 98.9 °F (37.2 °C) 78 18 97 %   05/26/22 0839 (!) 156/78 98.4 °F (36.9 °C) 81 18 97 %   05/26/22 0836 -- -- -- -- 97 %     No intake/output data recorded. No intake/output data recorded. Physical Exam:   Physical Exam  Cardiovascular:      Rate and Rhythm: Regular rhythm. Pulmonary:      Breath sounds: Normal breath sounds. Abdominal:      General: Bowel sounds are normal.      Palpations: Abdomen is soft. Musculoskeletal:         General: No swelling. Skin:     General: Skin is warm. Neurological:      Mental Status: She is alert. US RETROPERITONEUM COMP   Final Result   No hydronephrosis. Data Review   Recent Labs     05/26/22  0740 05/24/22  1155   WBC 9.1 12.4*   HGB 9.7* 10.9*   HCT 30.2* 33.3*   * 494*     Recent Labs     05/26/22  0740 05/25/22  0750 05/24/22  1155    139 136   K 3.2* 3.2* 3.2*   * 105 102   CO2 26 26 28   * 116* 111*   BUN 14 23* 26*   CREA 1.37* 2.09* 2.73*   CA 8.4* 8.6 9.0   ALB 2.4*  --  2.8*   ALT 12  --  17     No components found for: GLPOC  No results for input(s): PH, PCO2, PO2, HCO3, FIO2 in the last 72 hours. No results for input(s): INR, INREXT, INREXT in the last 72 hours. Assessment:           Active Problems:    ATN (acute tubular necrosis) ([facility]) (5/24/2022)      UTI (urinary tract infection) (5/24/2022)      CHASIDY (acute kidney injury) ([facility] [address] (5/24/2022)    Acute kidney injury secondary to volume contraction caused by hydrochlorothiazide. There is a contribution from NSAID use as well. Renal function is improving. Her baseline creatinine was 0.66 mg as of April 2021. Hypokalemia. Anemia, normocytic. Hypertension stage II    Plan:   Restart losartan 50 mg daily  This will help with potassium replacement as well. Discontinue hydrochlorothiazide  Avoid NSAIDs/Marmolejo 2 inhibitors  Would prefer to replace potassium orally to avoid renal loss of potassium.

## 2022-05-26 NOTE — PROGRESS NOTES
Patient O2 sat on RA at rest 97%, HR 84. Patient walked unassisted without distress. Patient O2 sat on RA while walking 95%, HR 82. Patient placed back in bed post walk, O2 sat on RA at rest 96%, HR 82.

## 2022-05-26 NOTE — CONSULTS
Progress Note    Patient: Enma Hall MRN: [id number]  SSN: [id number]    YOB: 1945  Age: 77 y.o. Sex: female      Admit Date: 5/24/2022    LOS: 2 days     Subjective:   78 YO WF admitted for ATN, CHASIDY with a known pessary in place for pelvic prolapse. Pt was seen recently at her MD's office- but felt bad that day and was rescheduled. Pt feels like the pessary has shifted a little but denies it falling out or shifting anymore than usual.   Objective:     Vitals:    05/25/22 1947 05/26/22 0836 05/26/22 0839 05/26/22 1502   BP: (!) 151/78  (!) 156/78 (!) 153/85   Pulse: 71  81 78   Resp: 18  18 18   Temp: 98.6 °F (37 °C)  98.4 °F (36.9 °C) 98.9 °F (37.2 °C)   SpO2: 97% 97% 97% 97%   Weight:       Height:            Intake and Output:  Current Shift: 05/26 0701 - 05/26 1900  In: -   Out: 250 [Urine:250]  Last three shifts: No intake/output data recorded. Physical Exam:   Sitting comfortably up in a chair- no distress  Pelvic Declined    Lab/Data Review: All lab results for the last 24 hours reviewed. Recent Results (from the past 24 hour(s))   METABOLIC PANEL, COMPREHENSIVE    Collection Time: 05/26/22  7:40 AM   Result Value Ref Range    Sodium 141 136 - 145 mmol/L    Potassium 3.2 (L) 3.5 - 5.1 mmol/L    Chloride 109 (H) 97 - 108 mmol/L    CO2 26 21 - 32 mmol/L    Anion gap 6 5 - 15 mmol/L    Glucose 104 (H) 65 - 100 mg/dL    BUN 14 6 - 20 mg/dL    Creatinine 1.37 (H) 0.55 - 1.02 mg/dL    BUN/Creatinine ratio 10 (L) 12 - 20      GFR est AA 46 (L) >60 ml/min/1.73m2    GFR est non-AA 38 (L) >60 ml/min/1.73m2    Calcium 8.4 (L) 8.5 - 10.1 mg/dL    Bilirubin, total 0.2 0.2 - 1.0 mg/dL    AST (SGOT) 14 (L) 15 - 37 U/L    ALT (SGPT) 12 12 - 78 U/L    Alk.  phosphatase 107 45 - 117 U/L    Protein, total 6.8 6.4 - 8.2 g/dL    Albumin 2.4 (L) 3.5 - 5.0 g/dL    Globulin 4.4 (H) 2.0 - 4.0 g/dL    A-G Ratio 0.5 (L) 1.1 - 2.2     CBC WITH AUTOMATED DIFF    Collection Time: 05/26/22  7:40 AM Result Value Ref Range    WBC 9.1 3.6 - 11.0 K/uL    RBC 3.35 (L) 3.80 - 5.20 M/uL    HGB 9.7 (L) 11.5 - 16.0 g/dL    HCT 30.2 (L) 35.0 - 47.0 %    MCV 90.1 80.0 - 99.0 FL    MCH 29.0 26.0 - 34.0 PG    MCHC 32.1 30.0 - 36.5 g/dL    RDW 13.8 11.5 - 14.5 %    PLATELET 513 (H) 480 - 400 K/uL    MPV 9.7 8.9 - 12.9 FL    NRBC 0.0 0.0  WBC    ABSOLUTE NRBC 0.00 0.00 - 0.01 K/uL    NEUTROPHILS 66 32 - 75 %    LYMPHOCYTES 20 12 - 49 %    MONOCYTES 10 5 - 13 %    EOSINOPHILS 3 0 - 7 %    BASOPHILS 0 0 - 1 %    IMMATURE GRANULOCYTES 1 (H) 0 - 0.5 %    ABS. NEUTROPHILS 6.1 1.8 - 8.0 K/UL    ABS. LYMPHOCYTES 1.8 0.8 - 3.5 K/UL    ABS. MONOCYTES 0.9 0.0 - 1.0 K/UL    ABS. EOSINOPHILS 0.3 0.0 - 0.4 K/UL    ABS. BASOPHILS 0.0 0.0 - 0.1 K/UL    ABS. IMM. GRANS. 0.1 (H) 0.00 - 0.04 K/UL    DF AUTOMATED       US Results (most recent):  Results from Hospital Encounter encounter on 05/24/22    US RETROPERITONEUM COMP    Narrative  Kidney ultrasound, 5/25/2022    History: CHASIDY. Comparison: Including CT lumbar spine 7/23/2021. Findings: The right kidney measures 10.6 cm x 4.9 cm x 5.9 cm. .  The left kidney  measures 10.1 cm x 6.4 cm x 5.2 cm. The kidneys demonstrate normal echotexture. There is no evidence of hydronephrosis. The bladder has volume of 434 mL. No focal bladder wall thickening or  intraluminal debris is present. Post void residual volume is 222 mL. There is an incompletely visualized structure with a nodular component  protruding along the bladder base. This may correspond to a pessary on CT  lumbar spine 7/23/2021. Clinical correlation is recommended. The visualized abdominal aorta is 2.1 cm proximally, 1.8 cm in its midcourse and  1.2 cm distally. The visualized inferior vena cava is within normal limits. Impression  No hydronephrosis.            Assessment:     Active Problems:    ATN (acute tubular necrosis) (HCC) (5/24/2022)      UTI (urinary tract infection) (5/24/2022)      CHASIDY (acute kidney injury) ([facility] [address] (5/24/2022)      Other female genital prolapse (5/26/2022)      Vaginal pessary present (5/26/2022)        Plan:     Nothing needs to be done with pessary- DWP she will f/up with her GYN as scheduled    Signed By: Roz Granados MD     May 26, 2022

## 2022-05-26 NOTE — PROGRESS NOTES
Physician Progress Note      Margaret Hill  [facility] #:                  [id number]  :                       1945  ADMIT DATE:       2022 11:28 AM  DISCH DATE:  RESPONDING  PROVIDER #:        Luz COHN          QUERY TEXT:    Dear Allykate Borden -    Patient admitted with dehydration, CHASIDY/ATN, UTI, chronic diarrhea. If possible, please document in progress notes and discharge summary if you are evaluating and /or treating any of the following: The medical record reflects the following:  Risk Factors: 77 F presented to ED as referred by a PCP with abnormal renal function; admitted with CHASIDY/ATN, dehydration, UTI, chronic diarrhea  Clinical Indicators: Malnutrition assessment documented 22 notes mild malnutrition in context on chronic illness AEB: mild decrease in energy intake and weight loss of 10% over 6 months; BMI 26.33; total protein 6.8; albumin 2.4  Treatment: labs, IV fluids, IV ABT, Nutrition consult, Ensure enlive TID, Remeron QHS    ASPEN Criteria:  https://aspenjournals. onlinelibrary. celis. com/doi/full/10.1177/2420598279644517    Thank you,  CLIFTON BatemanN, RN, CRCR  Clinical   [email or web address] or contact via Perfect Serve  Options provided:  -- Protein calorie malnutrition mild  -- Protein calorie malnutrition moderate  -- Protein calorie malnutrition severe  -- Other - I will add my own diagnosis  -- Disagree - Not applicable / Not valid  -- Disagree - Clinically unable to determine / Unknown  -- Refer to Clinical Documentation Reviewer    PROVIDER RESPONSE TEXT:    This patient has mild protein calorie malnutrition.     Query created by: Ines Collet on 2022 10:38 AM      Electronically signed by:  Luz COHN 2022 2:54 PM

## 2022-05-26 NOTE — PROGRESS NOTES
Nurse obtained stool sample and urine cultures and UA and sent them to the lab.        @[id number] Nurse sent a clean stool sample to the lab

## 2022-05-26 NOTE — PROGRESS NOTES
Hospitalist Progress Note    Subjective:   Daily Progress Note: 2022 10:13 AM    Patient is feeling overall better. Normally follows up with primary OB/GYN every 3 to 6 weeks but was unable to follow-up this month due to feeling ill. Current Facility-Administered Medications   Medication Dose Route Frequency    sodium chloride (NS) flush 5-40 mL  5-40 mL IntraVENous Q8H    sodium chloride (NS) flush 5-40 mL  5-40 mL IntraVENous PRN    acetaminophen (TYLENOL) tablet 650 mg  650 mg Oral Q6H PRN    Or    acetaminophen (TYLENOL) suppository 650 mg  650 mg Rectal Q6H PRN    polyethylene glycol (MIRALAX) packet 17 g  17 g Oral DAILY PRN    ondansetron (ZOFRAN ODT) tablet 4 mg  4 mg Oral Q8H PRN    Or    ondansetron (ZOFRAN) injection 4 mg  4 mg IntraVENous Q6H PRN    enoxaparin (LOVENOX) injection 30 mg  30 mg SubCUTAneous DAILY    mirtazapine (REMERON) tablet 15 mg  15 mg Oral QHS    pantoprazole (PROTONIX) tablet 40 mg  40 mg Oral ACB    rOPINIRole (REQUIP) tablet 2 mg  2 mg Oral QHS    cefTRIAXone (ROCEPHIN) 1 g in sterile water (preservative free) 10 mL IV syringe  1 g IntraVENous Q24H    0.9% sodium chloride infusion  75 mL/hr IntraVENous CONTINUOUS        Review of Systems  Review of Systems   Constitutional: Negative. Respiratory: Negative. Cardiovascular: Negative. Gastrointestinal: Negative for abdominal pain, blood in stool, nausea and vomiting. Genitourinary: Negative for dysuria. Neurological: Positive for weakness. All other systems reviewed and are negative. Objective:     Visit Vitals  BP (!) 156/78 (BP 1 Location: Right upper arm, BP Patient Position: Sitting)   Pulse 81   Temp 98.4 °F (36.9 °C)   Resp 18   Ht 5' 2.01\" (1.575 m)   Wt 65.3 kg (144 lb)   SpO2 97%   BMI 26.33 kg/m²      O2 Device: None (Room air)    Temp (24hrs), Av.3 °F (36.8 °C), Min:97.5 °F (36.4 °C), Max:98.6 °F (37 °C)      No intake/output data recorded.   No intake/output data recorded. Recent Results (from the past 24 hour(s))   HEMOGLOBIN A1C WITH EAG    Collection Time: 05/25/22 10:00 AM   Result Value Ref Range    Hemoglobin A1c 6.5 (H) 4.0 - 5.6 %    Est. average glucose 140 mg/dL   GLUCOSE, POC    Collection Time: 05/25/22 11:57 AM   Result Value Ref Range    Glucose (POC) 108 65 - 117 mg/dL    Performed by Kiana Francis (Float)    METABOLIC PANEL, COMPREHENSIVE    Collection Time: 05/26/22  7:40 AM   Result Value Ref Range    Sodium 141 136 - 145 mmol/L    Potassium 3.2 (L) 3.5 - 5.1 mmol/L    Chloride 109 (H) 97 - 108 mmol/L    CO2 26 21 - 32 mmol/L    Anion gap 6 5 - 15 mmol/L    Glucose 104 (H) 65 - 100 mg/dL    BUN 14 6 - 20 mg/dL    Creatinine 1.37 (H) 0.55 - 1.02 mg/dL    BUN/Creatinine ratio 10 (L) 12 - 20      GFR est AA 46 (L) >60 ml/min/1.73m2    GFR est non-AA 38 (L) >60 ml/min/1.73m2    Calcium 8.4 (L) 8.5 - 10.1 mg/dL    Bilirubin, total 0.2 0.2 - 1.0 mg/dL    AST (SGOT) 14 (L) 15 - 37 U/L    ALT (SGPT) 12 12 - 78 U/L    Alk. phosphatase 107 45 - 117 U/L    Protein, total 6.8 6.4 - 8.2 g/dL    Albumin 2.4 (L) 3.5 - 5.0 g/dL    Globulin 4.4 (H) 2.0 - 4.0 g/dL    A-G Ratio 0.5 (L) 1.1 - 2.2     CBC WITH AUTOMATED DIFF    Collection Time: 05/26/22  7:40 AM   Result Value Ref Range    WBC 9.1 3.6 - 11.0 K/uL    RBC 3.35 (L) 3.80 - 5.20 M/uL    HGB 9.7 (L) 11.5 - 16.0 g/dL    HCT 30.2 (L) 35.0 - 47.0 %    MCV 90.1 80.0 - 99.0 FL    MCH 29.0 26.0 - 34.0 PG    MCHC 32.1 30.0 - 36.5 g/dL    RDW 13.8 11.5 - 14.5 %    PLATELET 888 (H) 562 - 400 K/uL    MPV 9.7 8.9 - 12.9 FL    NRBC 0.0 0.0  WBC    ABSOLUTE NRBC 0.00 0.00 - 0.01 K/uL    NEUTROPHILS 66 32 - 75 %    LYMPHOCYTES 20 12 - 49 %    MONOCYTES 10 5 - 13 %    EOSINOPHILS 3 0 - 7 %    BASOPHILS 0 0 - 1 %    IMMATURE GRANULOCYTES 1 (H) 0 - 0.5 %    ABS. NEUTROPHILS 6.1 1.8 - 8.0 K/UL    ABS. LYMPHOCYTES 1.8 0.8 - 3.5 K/UL    ABS. MONOCYTES 0.9 0.0 - 1.0 K/UL    ABS. EOSINOPHILS 0.3 0.0 - 0.4 K/UL    ABS. BASOPHILS 0.0 0.0 - 0.1 K/UL    ABS. IMM. GRANS. 0.1 (H) 0.00 - 0.04 K/UL    DF AUTOMATED          US RETROPERITONEUM COMP   Final Result   No hydronephrosis. PHYSICAL EXAM:    Physical Exam  Vitals and nursing note reviewed. Constitutional:       Appearance: She is obese. She is ill-appearing. HENT:      Head: Normocephalic and atraumatic. Mouth/Throat:      Mouth: Mucous membranes are moist.   Cardiovascular:      Rate and Rhythm: Normal rate and regular rhythm. Pulmonary:      Effort: No respiratory distress. Breath sounds: No wheezing. Abdominal:      General: Bowel sounds are normal. There is no distension. Palpations: Abdomen is soft. Tenderness: There is abdominal tenderness. Comments: Mild tenderness to palpation of left lower quadrant   Genitourinary:     Comments: No Rangel present  Musculoskeletal:      Right lower leg: No edema. Left lower leg: No edema. Skin:     General: Skin is warm. Capillary Refill: Capillary refill takes less than 2 seconds. Neurological:      Mental Status: She is alert and oriented to person, place, and time.    Psychiatric:         Mood and Affect: Mood normal.          Data Review    Recent Results (from the past 24 hour(s))   HEMOGLOBIN A1C WITH EAG    Collection Time: 05/25/22 10:00 AM   Result Value Ref Range    Hemoglobin A1c 6.5 (H) 4.0 - 5.6 %    Est. average glucose 140 mg/dL   GLUCOSE, POC    Collection Time: 05/25/22 11:57 AM   Result Value Ref Range    Glucose (POC) 108 65 - 117 mg/dL    Performed by Giuseppe Payton (Float)    METABOLIC PANEL, COMPREHENSIVE    Collection Time: 05/26/22  7:40 AM   Result Value Ref Range    Sodium 141 136 - 145 mmol/L    Potassium 3.2 (L) 3.5 - 5.1 mmol/L    Chloride 109 (H) 97 - 108 mmol/L    CO2 26 21 - 32 mmol/L    Anion gap 6 5 - 15 mmol/L    Glucose 104 (H) 65 - 100 mg/dL    BUN 14 6 - 20 mg/dL    Creatinine 1.37 (H) 0.55 - 1.02 mg/dL    BUN/Creatinine ratio 10 (L) 12 - 20      GFR est AA 46 (L) >60 ml/min/1.73m2    GFR est non-AA 38 (L) >60 ml/min/1.73m2    Calcium 8.4 (L) 8.5 - 10.1 mg/dL    Bilirubin, total 0.2 0.2 - 1.0 mg/dL    AST (SGOT) 14 (L) 15 - 37 U/L    ALT (SGPT) 12 12 - 78 U/L    Alk. phosphatase 107 45 - 117 U/L    Protein, total 6.8 6.4 - 8.2 g/dL    Albumin 2.4 (L) 3.5 - 5.0 g/dL    Globulin 4.4 (H) 2.0 - 4.0 g/dL    A-G Ratio 0.5 (L) 1.1 - 2.2     CBC WITH AUTOMATED DIFF    Collection Time: 05/26/22  7:40 AM   Result Value Ref Range    WBC 9.1 3.6 - 11.0 K/uL    RBC 3.35 (L) 3.80 - 5.20 M/uL    HGB 9.7 (L) 11.5 - 16.0 g/dL    HCT 30.2 (L) 35.0 - 47.0 %    MCV 90.1 80.0 - 99.0 FL    MCH 29.0 26.0 - 34.0 PG    MCHC 32.1 30.0 - 36.5 g/dL    RDW 13.8 11.5 - 14.5 %    PLATELET 209 (H) 535 - 400 K/uL    MPV 9.7 8.9 - 12.9 FL    NRBC 0.0 0.0  WBC    ABSOLUTE NRBC 0.00 0.00 - 0.01 K/uL    NEUTROPHILS 66 32 - 75 %    LYMPHOCYTES 20 12 - 49 %    MONOCYTES 10 5 - 13 %    EOSINOPHILS 3 0 - 7 %    BASOPHILS 0 0 - 1 %    IMMATURE GRANULOCYTES 1 (H) 0 - 0.5 %    ABS. NEUTROPHILS 6.1 1.8 - 8.0 K/UL    ABS. LYMPHOCYTES 1.8 0.8 - 3.5 K/UL    ABS. MONOCYTES 0.9 0.0 - 1.0 K/UL    ABS. EOSINOPHILS 0.3 0.0 - 0.4 K/UL    ABS. BASOPHILS 0.0 0.0 - 0.1 K/UL    ABS. IMM. GRANS. 0.1 (H) 0.00 - 0.04 K/UL    DF AUTOMATED          Assessment/Plan:     Active Problems:    ATN (acute tubular necrosis) ([facility]) (5/24/2022)      UTI (urinary tract infection) (5/24/2022)      CHASIDY (acute kidney injury) ([facility] [address] (5/24/2022)        Hospital Course:    Leonel Boeck is a 77year old female with a PMH of diabetes, hypercholesteremia, and hypertension who presented with weakness and diarrhea. Vital signs in ED unremarkable. Initial labs significant for white blood cell count of 12.4, hemoglobin of 10.9, potassium of 3.2, creatinine of 2.73, BUN of 10, and alk phos of 137. UA positive for leuk esterase and hematuria. Patient to be admitted for further work-up.  Patient started on IV fluids and ceftriaxone. Nephrology consulted. Urine culture pending. Stool studies pending. Retroperitoneal ultrasound showed nodular component protruding along the bladder base without hydronephrosis. Acute kidney injury  Secondary to dehydration and NSAIDs  Creatinine improving, will trend  Continue on IV fluids   Renal ultrasound showed nodular component protruding along the bladder base without hydronephrosis  Nephrology following    Chronic diarrhea  Stool studies pending    Urinary tract infection  Continue on ceftriaxone  5/25 urine: pending    Pelvic prolapse  S/p pessary   Follows up with primary OB/GYN every 3 to 6 weeks    Diabetes mellitus, type II  A1c 6.5  Sliding scale insulin as needed    DVT Prophylaxis: lovenox  GI Prophylaxis: protonix  Discharge and disposition barriers: IV abx, creatinine improvement, OB/GYN consult, 24 to 48 hours    Care Plan discussed with: Patient/Family, Nurse and     Total time spent with patient: 35 minutes.

## 2022-05-27 VITALS
SYSTOLIC BLOOD PRESSURE: 137 MMHG | TEMPERATURE: 99.2 F | OXYGEN SATURATION: 96 % | HEIGHT: 62 IN | DIASTOLIC BLOOD PRESSURE: 74 MMHG | HEART RATE: 77 BPM | WEIGHT: 144 LBS | BODY MASS INDEX: 26.5 KG/M2 | RESPIRATION RATE: 18 BRPM

## 2022-05-27 LAB
ALBUMIN SERPL-MCNC: 2.5 G/DL (ref 3.5–5)
ALBUMIN/GLOB SERPL: 0.6 {RATIO} (ref 1.1–2.2)
ALP SERPL-CCNC: 102 U/L (ref 45–117)
ALT SERPL-CCNC: 13 U/L (ref 12–78)
ANION GAP SERPL CALC-SCNC: 5 MMOL/L (ref 5–15)
AST SERPL W P-5'-P-CCNC: 15 U/L (ref 15–37)
BACTERIA SPEC CULT: NORMAL
BASOPHILS # BLD: 0 K/UL (ref 0–0.1)
BASOPHILS NFR BLD: 0 % (ref 0–1)
BILIRUB SERPL-MCNC: 0.3 MG/DL (ref 0.2–1)
BUN SERPL-MCNC: 13 MG/DL (ref 6–20)
BUN/CREAT SERPL: 13 (ref 12–20)
CA-I BLD-MCNC: 8.5 MG/DL (ref 8.5–10.1)
CHLORIDE SERPL-SCNC: 108 MMOL/L (ref 97–108)
CO2 SERPL-SCNC: 28 MMOL/L (ref 21–32)
COLLECT DATE STL: NORMAL
CREAT SERPL-MCNC: 0.97 MG/DL (ref 0.55–1.02)
DIFFERENTIAL METHOD BLD: ABNORMAL
EOSINOPHIL # BLD: 0.4 K/UL (ref 0–0.4)
EOSINOPHIL NFR BLD: 4 % (ref 0–7)
ERYTHROCYTE [DISTWIDTH] IN BLOOD BY AUTOMATED COUNT: 14.2 % (ref 11.5–14.5)
GLOBULIN SER CALC-MCNC: 4.3 G/DL (ref 2–4)
GLUCOSE SERPL-MCNC: 100 MG/DL (ref 65–100)
HCT VFR BLD AUTO: 30.8 % (ref 35–47)
HEMOCCULT SP1 STL QL: NEGATIVE
HGB BLD-MCNC: 9.8 G/DL (ref 11.5–16)
IMM GRANULOCYTES # BLD AUTO: 0.1 K/UL (ref 0–0.04)
IMM GRANULOCYTES NFR BLD AUTO: 1 % (ref 0–0.5)
LYMPHOCYTES # BLD: 1.8 K/UL (ref 0.8–3.5)
LYMPHOCYTES NFR BLD: 19 % (ref 12–49)
MAGNESIUM SERPL-MCNC: 1.1 MG/DL (ref 1.6–2.4)
MCH RBC QN AUTO: 28.8 PG (ref 26–34)
MCHC RBC AUTO-ENTMCNC: 31.8 G/DL (ref 30–36.5)
MCV RBC AUTO: 90.6 FL (ref 80–99)
MONOCYTES # BLD: 1 K/UL (ref 0–1)
MONOCYTES NFR BLD: 10 % (ref 5–13)
NEUTS SEG # BLD: 6.2 K/UL (ref 1.8–8)
NEUTS SEG NFR BLD: 66 % (ref 32–75)
NRBC # BLD: 0 K/UL (ref 0–0.01)
NRBC BLD-RTO: 0 PER 100 WBC
PLATELET # BLD AUTO: 448 K/UL (ref 150–400)
PMV BLD AUTO: 10 FL (ref 8.9–12.9)
POTASSIUM SERPL-SCNC: 3.5 MMOL/L (ref 3.5–5.1)
PROT SERPL-MCNC: 6.8 G/DL (ref 6.4–8.2)
RBC # BLD AUTO: 3.4 M/UL (ref 3.8–5.2)
SODIUM SERPL-SCNC: 141 MMOL/L (ref 136–145)
SPECIAL REQUESTS,SREQ: NORMAL
WBC # BLD AUTO: 9.5 K/UL (ref 3.6–11)

## 2022-05-27 PROCEDURE — 74011000250 HC RX REV CODE- 250: Performed by: INTERNAL MEDICINE

## 2022-05-27 PROCEDURE — 36415 COLL VENOUS BLD VENIPUNCTURE: CPT

## 2022-05-27 PROCEDURE — 74011250637 HC RX REV CODE- 250/637: Performed by: INTERNAL MEDICINE

## 2022-05-27 PROCEDURE — 97161 PT EVAL LOW COMPLEX 20 MIN: CPT

## 2022-05-27 PROCEDURE — 80053 COMPREHEN METABOLIC PANEL: CPT

## 2022-05-27 PROCEDURE — 97530 THERAPEUTIC ACTIVITIES: CPT

## 2022-05-27 PROCEDURE — 83735 ASSAY OF MAGNESIUM: CPT

## 2022-05-27 PROCEDURE — 85025 COMPLETE CBC W/AUTO DIFF WBC: CPT

## 2022-05-27 PROCEDURE — 87177 OVA AND PARASITES SMEARS: CPT

## 2022-05-27 PROCEDURE — 82272 OCCULT BLD FECES 1-3 TESTS: CPT

## 2022-05-27 PROCEDURE — 74011250636 HC RX REV CODE- 250/636: Performed by: INTERNAL MEDICINE

## 2022-05-27 RX ORDER — BENZONATATE 100 MG/1
100 CAPSULE ORAL
Qty: 21 CAPSULE | Refills: 0 | Status: SHIPPED | OUTPATIENT
Start: 2022-05-27 | End: 2022-06-03

## 2022-05-27 RX ORDER — GUAIFENESIN 1200 MG/1
1200 TABLET, EXTENDED RELEASE ORAL 2 TIMES DAILY
Qty: 14 TABLET | Refills: 0 | Status: SHIPPED | OUTPATIENT
Start: 2022-05-27 | End: 2022-10-10 | Stop reason: ALTCHOICE

## 2022-05-27 RX ORDER — LOSARTAN POTASSIUM 50 MG/1
50 TABLET ORAL DAILY
Qty: 30 TABLET | Refills: 0 | Status: SHIPPED | OUTPATIENT
Start: 2022-05-28 | End: 2022-07-11

## 2022-05-27 RX ADMIN — LOSARTAN POTASSIUM 50 MG: 50 TABLET, FILM COATED ORAL at 09:05

## 2022-05-27 RX ADMIN — SODIUM CHLORIDE, PRESERVATIVE FREE 10 ML: 5 INJECTION INTRAVENOUS at 14:21

## 2022-05-27 RX ADMIN — ENOXAPARIN SODIUM 40 MG: 100 INJECTION SUBCUTANEOUS at 09:05

## 2022-05-27 RX ADMIN — PANTOPRAZOLE SODIUM 40 MG: 20 TABLET, DELAYED RELEASE ORAL at 08:06

## 2022-05-27 RX ADMIN — SODIUM CHLORIDE 75 ML/HR: 9 INJECTION, SOLUTION INTRAVENOUS at 02:29

## 2022-05-27 RX ADMIN — SODIUM CHLORIDE, PRESERVATIVE FREE 10 ML: 5 INJECTION INTRAVENOUS at 06:17

## 2022-05-27 NOTE — PROGRESS NOTES
DC Plan: Home    CM met with pt at the bedside. Medicare pt has received, reviewed, and signed 2nd IM letter informing them of their right to appeal the discharge. Signed copied has been placed on pt bedside chart. Discharge plan of care/case management plan validated with provider's discharge order.

## 2022-05-27 NOTE — DISCHARGE INSTRUCTIONS
Patient Education        Acute Kidney Injury: Care Instructions  Overview     Acute kidney injury (CHASIDY) is a sudden decrease in kidney function. This can happen over a period of hours, days or, in some cases, weeks. CHASIDY used to be called acute renal failure, but kidney failure doesn't always happen with CHASIDY. Common causes of CHASIDY are serious infection, blood loss, and some medicines. When CHASIDY happens, the kidneys have trouble removing waste and excess fluids from the body. The waste and fluids build up and become harmful. Kidney function may return to normal if the cause of CHASIDY is treated quickly. Your chance of a full recovery depends on what caused the problem, how quickly the cause was treated, and what other medical problems you have. You may have a treatment called dialysis. It does the work of healthy kidneys to remove waste and fluids for a short time. Follow-up care is a key part of your treatment and safety. Be sure to make and go to all appointments, and call your doctor if you are having problems. It's also a good idea to know your test results and keep a list of the medicines you take. How can you care for yourself at home? · Talk to your doctor about how much fluid you should drink. · Eat a balanced diet. Talk to your doctor or a dietitian about what type of diet may be best for you. You may need to limit sodium, potassium, and phosphorus. · If you need dialysis, follow the instructions and schedule for dialysis that your doctor gives you. · Do not smoke. Smoking can make your condition worse. If you need help quitting, talk to your doctor about stop-smoking programs and medicines. These can increase your chances of quitting for good. · Limit alcohol. · Review all of your medicines with your doctor.  Do not take any medicines, including nonsteroidal anti-inflammatory drugs (NSAIDs), such as ibuprofen (Advil, Motrin) or naproxen (Aleve), unless your doctor says it is safe for you to do so.  · Make sure that anyone treating you for any health problem knows that you have had CHASIDY. When should you call for help? Call 911 anytime you think you may need emergency care. For example, call if:    · You passed out (lost consciousness). Call your doctor now or seek immediate medical care if:    · You have new or worse nausea and vomiting.     · You have much less urine than normal, or you have no urine.     · You are feeling confused or cannot think clearly.     · You have new or more blood in your urine.     · You have new swelling.     · You are dizzy or lightheaded, or feel like you may faint. Watch closely for changes in your health, and be sure to contact your doctor if:    · You do not get better as expected. Where can you learn more? Go to http://www.naranjo.com/  Enter D336 in the search box to learn more about \"Acute Kidney Injury: Care Instructions. \"  Current as of: September 8, 2021               Content Version: 13.2  © 2006-2022 Healthwise, Incorporated. Care instructions adapted under license by [employer] (which disclaims liability or warranty for this information). If you have questions about a medical condition or this instruction, always ask your healthcare professional. [address] any warranty or liability for your use of this information.

## 2022-05-27 NOTE — ROUTINE PROCESS
Bedside and Verbal shift change report given to George Escobar (oncoming nurse) by Mohinder Dawson (offgoing nurse). Report included the following information SBAR and MAR.

## 2022-05-27 NOTE — PROGRESS NOTES
DC Plan:  Sondra at [facility] ([facility]: 5/28/22)       [facility] AT [facility] for rolling walker    CM spoke with PT. PT is recommending SNF vs HH with 24/7 supervision. Cm met with pt at the beside. CM informed pt of PT's recommendation. Pt declined SNF. Pt indicated she has a disabled son at home she cares for. Pt states she has a meeting with her son's  next week. Pt reports she is going to see if she can get her son into a group home. Pt is thinking about selling her home and moving into an apartment/home for seniors. Pt reports her son - Carmen Cottrell assists her with taking care of her disabled son. Pt indicated Carmen Cottrell is with her 99% of the time. Pt states Carmen Cottrell is with her son right now. Cm asked pt if Carmen Cottrell can pick her up from the hospital. Pt indicated they have a car at home, but they are unable to drive that car at this time. Pt's other car is in the hospital's parking lot. CM asked pt if she felt safe driving her car home. Pt confirmed she felt safe driving her car. Cm asked pt if her son Carmen Cottrell is aware she is coming home today. Pt confirmed he is aware of discharge. Since pt does not want SNF, CM discussed home health and ordering a rolling walker. Choice letter signed. Cm informed pt Cm will speak with her nurse and see if someone can assist her to her car. Pt was agreeable wit plan. Cm spoke with pt's nurse. Cm messaged attending via Perfect Serve regarding modifying discharge order and ordering a rolling walker. [facility] indicated pt's walker is ready. Pt is aware and indicated she will try to stop by and pick it up. Discharge plan of care/case management plan validated with provider's discharge order.

## 2022-05-27 NOTE — PROGRESS NOTES
Problem: Falls - Risk of  Goal: *Absence of Falls  Description: Document Rogelio Sousa Fall Risk and appropriate interventions in the flowsheet. Outcome: Progressing Towards Goal  Note: Fall Risk Interventions:            Medication Interventions: Bed/chair exit alarm,Patient to call before getting OOB,Teach patient to arise slowly                   Problem: Patient Education: Go to Patient Education Activity  Goal: Patient/Family Education  Outcome: Progressing Towards Goal     Problem: Diabetes Self-Management  Goal: *Disease process and treatment process  Description: Define diabetes and identify own type of diabetes; list 3 options for treating diabetes. Outcome: Progressing Towards Goal  Goal: *Incorporating nutritional management into lifestyle  Description: Describe effect of type, amount and timing of food on blood glucose; list 3 methods for planning meals. Outcome: Progressing Towards Goal  Goal: *Incorporating physical activity into lifestyle  Description: State effect of exercise on blood glucose levels. Outcome: Progressing Towards Goal  Goal: *Developing strategies to promote health/change behavior  Description: Define the ABC's of diabetes; identify appropriate screenings, schedule and personal plan for screenings. Outcome: Progressing Towards Goal  Goal: *Using medications safely  Description: State effect of diabetes medications on diabetes; name diabetes medication taking, action and side effects. Outcome: Progressing Towards Goal  Goal: *Monitoring blood glucose, interpreting and using results  Description: Identify recommended blood glucose targets  and personal targets. Outcome: Progressing Towards Goal  Goal: *Prevention, detection, treatment of acute complications  Description: List symptoms of hyper- and hypoglycemia; describe how to treat low blood sugar and actions for lowering  high blood glucose level.   Outcome: Progressing Towards Goal  Goal: *Prevention, detection and treatment of chronic complications  Description: Define the natural course of diabetes and describe the relationship of blood glucose levels to long term complications of diabetes.   Outcome: Progressing Towards Goal  Goal: *Developing strategies to address psychosocial issues  Description: Describe feelings about living with diabetes; identify support needed and support network  Outcome: Progressing Towards Goal  Goal: *Insulin pump training  Outcome: Progressing Towards Goal  Goal: *Sick day guidelines  Outcome: Progressing Towards Goal  Goal: *Patient Specific Goal (EDIT GOAL, INSERT TEXT)  Outcome: Progressing Towards Goal

## 2022-05-27 NOTE — PROGRESS NOTES
Problem: Falls - Risk of  Goal: *Absence of Falls  Description: Document Patricia Edwards Fall Risk and appropriate interventions in the flowsheet.   5/27/2022 1738 by Quiana Jose RN  Outcome: Resolved/Met  5/27/2022 1041 by Quiana Jose RN  Outcome: Progressing Towards Goal  Note: Fall Risk Interventions:            Medication Interventions: Bed/chair exit alarm,Patient to call before getting OOB,Teach patient to arise slowly

## 2022-05-27 NOTE — DISCHARGE SUMMARY
Admit date: 5/24/2022   Admitting Provider: Maria M Rice MD    Discharge date: 5/27/2022  Discharging Provider: LALIT Canela      * Admission Diagnoses: ATN (acute tubular necrosis) ([facility] [N17.0]  CHASIDY (acute kidney injury) ([facility] [N17.9]  UTI (urinary tract infection) [N39.0]    * Discharge Diagnoses:    Hospital Problems as of 5/27/2022 Date Reviewed: 5/23/2022          Codes Class Noted - Resolved POA    Other female genital prolapse ICD-10-CM: N81.89  ICD-9-CM: 618.89  5/26/2022 - Present Unknown        Vaginal pessary present ICD-10-CM: Z96.0  ICD-9-CM: V45.89  5/26/2022 - Present Unknown        ATN (acute tubular necrosis) ([facility] ICD-10-CM: N17.0  ICD-9-CM: 584.5  5/24/2022 - Present Unknown        UTI (urinary tract infection) ICD-10-CM: N39.0  ICD-9-CM: 599.0  5/24/2022 - Present Unknown        CHASIDY (acute kidney injury) [facility]) ICD-10-CM: N17.9  ICD-9-CM: 584.9  5/24/2022 - Present Unknown              * Hospital Course:     Carla Paul is a 77year old female with a PMH of diabetes, hypercholesteremia, and hypertension who presented with weakness, cough, and diarrhea. Vital signs in ED unremarkable. Initial labs significant for white blood cell count of 12.4, hemoglobin of 10.9, potassium of 3.2, creatinine of 2.73, BUN of 10, and alk phos of 137. UA positive for leuk esterase and hematuria. Patient to be admitted for further work-up. Patient started on IV fluids and ceftriaxone. Nephrology consulted. Urine culture pending. Retroperitoneal ultrasound showed nodular component protruding along the bladder base without hydronephrosis. OB/GYN consulted and deemed outpatient follow-up indicated. Patient did not had any more diarrheal episodes during hospitalization. Patient strength in alertness improved. Patient hydrochlorothiazide discontinued. Patient informed of the risks of using NSAIDs and effects on kidney function. Patient to follow-up with PCP and nephrology within 2 weeks of discharge.  All questions answered at time of encounter. * Procedures:   * No surgery found *      Consults:   Nephrology and OB/GYN    Significant Diagnostic Studies: As discussed in hospital course    Discharge Exam:  Visit Vitals  BP (!) 148/92 (BP 1 Location: Right upper arm, BP Patient Position: At rest)   Pulse 77   Temp 99.2 °F (37.3 °C)   Resp 18   Ht 5' 2.01\" (1.575 m)   Wt 65.3 kg (144 lb)   SpO2 96%   BMI 26.33 kg/m²       PHYSICAL EXAM:  Vitals and nursing note reviewed. Constitutional:       Appearance: She is obese. She is ill-appearing. HENT:      Head: Normocephalic and atraumatic. Mouth/Throat:      Mouth: Mucous membranes are moist.   Cardiovascular:      Rate and Rhythm: Normal rate and regular rhythm. Pulmonary:      Effort: No respiratory distress. Breath sounds: No wheezing. Mild inspiratory crackles bilateral lower lobes. Abdominal:      General: Bowel sounds are normal. There is no distension. Palpations: Abdomen is soft. Tenderness: There is no abdominal tenderness. Genitourinary:     Comments: No Rangel present  Musculoskeletal:      Right lower leg: No edema. Left lower leg: No edema. Skin:     General: Skin is warm. Capillary Refill: Capillary refill takes less than 2 seconds. Neurological:      Mental Status: She is alert and oriented to person, place, and time. Psychiatric:         Mood and Affect: Mood normal.       * Discharge Condition: improved  * Disposition: Home    Discharge Medications:  Current Discharge Medication List      START taking these medications    Details   losartan (COZAAR) 50 mg tablet Take 1 Tablet by mouth daily. Qty: 30 Tablet, Refills: 0  Start date: 5/28/2022      guaiFENesin (Mucinex) 1,200 mg Ta12 ER tablet Take 1 Tablet by mouth two (2) times a day.   Qty: 14 Tablet, Refills: 0  Start date: 5/27/2022      benzonatate (Tessalon Perles) 100 mg capsule Take 1 Capsule by mouth three (3) times daily as needed for Cough for up to 7 days.  Qty: 21 Capsule, Refills: 0  Start date: 5/27/2022, End date: 6/3/2022      ipratropium-albuteroL (COMBIVENT RESPIMAT)  mcg/actuation inhaler Take 1 Puff by inhalation every six (6) hours. Qty: 4 g, Refills: 0  Start date: 5/27/2022         CONTINUE these medications which have NOT CHANGED    Details   rOPINIRole (REQUIP) 2 mg tablet Take 1 Tablet by mouth nightly. Qty: 90 Tablet, Refills: 1    Associated Diagnoses: Restless leg      Omeprazole delayed release (PRILOSEC D/R) 20 mg tablet Take 1 Tablet by mouth daily. Indications: gastroesophageal reflux disease  Qty: 90 Tablet, Refills: 1    Associated Diagnoses: GERD without esophagitis      ezetimibe (ZETIA) 10 mg tablet Take 1 Tablet by mouth daily. Qty: 90 Tablet, Refills: 1    Associated Diagnoses: Mixed hyperlipidemia      estradioL (ESTRACE) 0.01 % (0.1 mg/gram) vaginal cream insert 1 gram vaginally three times a week  Qty: 42.5 g, Refills: 1      cholecalciferol (VITAMIN D3) (1000 Units /25 mcg) tablet Take 1 Tablet by mouth daily. Indications: low vitamin D levels  Qty: 90 Tablet, Refills: 1    Associated Diagnoses: Vitamin D deficiency      mirtazapine (REMERON) 15 mg tablet Take 1 Tablet by mouth nightly. Qty: 90 Tablet, Refills: 1    Associated Diagnoses: Current severe episode of major depressive disorder without psychotic features, unspecified whether recurrent (HCC)      meclizine (ANTIVERT) 25 mg tablet take 1 tablet by mouth three times a day if needed for dizziness for up to 10 days  Qty: 30 Tablet, Refills: 1    Associated Diagnoses: Vertigo         STOP taking these medications       losartan-hydroCHLOROthiazide (HYZAAR) 100-12.5 mg per tablet Comments:   Reason for Stopping:         meloxicam (MOBIC) 15 mg tablet Comments:   Reason for Stopping:               * Follow-up Care/Patient Instructions:   Activity: Activity as tolerated  Diet: Cardiac Diet  Wound Care: None needed    Follow-up Information     Follow up With Specialties Details Why Contact Info    Siomara Serna MD Family Medicine Schedule an appointment as soon as possible for a visit in 2 weeks Post-hospitalization follow-up [address] [place] [address]  [phone]            Discharge summary greater than 35 minutes spent with the patient performing discharge instructions, medication review and physical exam    Signed:  LALIT Luu  5/27/2022  9:16 AM

## 2022-05-27 NOTE — PROGRESS NOTES
PHYSICAL THERAPY EVALUATION  Patient: Kisha Orozco (36 y.o. female)  Date: 5/27/2022  Primary Diagnosis: ATN (acute tubular necrosis) ([facility]) [N17.0]  CHASIDY (acute kidney injury) ([facility] [N17.9]  UTI (urinary tract infection) [N39.0]        Precautions: falls       ASSESSMENT  Pt is a 78 y/o F with PMH of diabetes, hypercholesteremia, and HTN, presented with weakness, cough, and diarrhea 5/24/22. Admitted for further work up. Prior level pt reports being indep prior to this. Reports she did not use any DME for ambulation. Reported to therapist 2 falls >1 year ago, however denied any falls over the past year. Does not own any DME besides crutches. Lives with 2 sons in a 1 level home with 3 AMANDA w/ B HR. States one son is disabled, and the other son helps her with him. Based on the objective data described below, the patient presents with decreased functional indep, and decreased balance requiring use of RW for steadiness. This session therapist initially assessed ambulation without use of DME as patient typically does not use DME at home. Pt required CGA - Heather at times without Rw, having 2 bouts of unsteadiness, and towards end of bout requiring hand held assist for balance. Mild path veering noted. Had pt sit in chair, this bout of ambulated completed ~ 60 ft. Discussed findings along with benefits of RW. Had pt utilize a RW, which improved qasim, balance, and mechanics. Overall completed with RW, step through pattern, no knee buckling, and no path veering, which improved assistance levels to stand by A-CGA. Cueing needed throughout session to maintain walker closer to self, however during second bout of ambulation this improved. Next we went into room, and therapist educated pt on functional step performance to mimic stairs at home. With a step stool, we performed 3 step ups with HR and CGA-Heather.  First performance pt did require Heather initially to maintain balance, however once therapist cued on performance, she was able to perform better with CGA. Performed this 3 times. Once finished, had pt rest. We then performed another bout of ~ 140 ft with RW, primarily stand by A this performance. Improved walker navigation noted. Once back in room, we discussed preping for functional transfers, in regards to placement in front of chair. Pt initially had a tendency to approach the bed/chair ~ 3  Ft away, turn backwards and walk backwards to surface, lifting the walker. Therapist provided fall safety eduction with demo on utilizing RW to go towards the bed, and then turning so she is closer to the surface, along with not ambulating backwards. Also educated on hand placement for this. After demo and cueing, we practiced this performance. Once at EOB, we practiced bed mobility. Pt able to perform supine<>sit well with supervision for safety. Once back at EOB, discussion again, along with during each transfer performance on safety, regarding stationary sitting, or standing prior to mobilizing to prevent unsteadiness. Cueing on hand placement needed for sit to stand practice. Overall performed a few sit to stands from chair with HR, along with from bed during session. Pt then ambulated ~ 10 ft to chair, completed with stand by A and RW. Pt showed good carryover of education provided regarding getting in safe position prior to sitting, as she was able to avoid ambulating backwards to chair and showed good walker navigation / body placement prior to sitting. Once in chair, therapist had pt rest. We then practiced standing with HR, and then therapist had pt pick item up off floor to mimic a task at home, with she required CGA for balance upon standing, along with head turns while standing, and cross body reaching while standing to mimic tasks at home, overall stand by A for balance on those tasks with RW. Therapist then provided recommendations.  Currently feel pt would benefit from either HHPT and home with 24/hr A (will need RW), as pt is primarily stand by A-CGA  throughout mobility (progressed to stand by A with ambulation on RW, and CGA with stair progression, initially Heather with both), vs SNF if she is unable to have that. Pt does prefer discharge home it sounds like, therefore discussion if possible on having son by her initially at home for a few days for safety/fall prevention. During session pt did demonstrate improved safety awareness, and was education on fall safety/fall prevention techniques, RW techniques, transfer techniques, and mobility techniques. RN and CM updated on session. Pt did good with session today. Pt will benefit from continued skilled PT to address above deficits and return to PLOF. Current PT DC recommendation Home w/ HHPT and 24 hr A, vs SNF. Current Level of Function Impacting Discharge (mobility/balance): stand by A - CGA, Heather initially    Other factors to consider for discharge: social support      PLAN :  Recommendations and Planned Interventions: bed mobility training, transfer training, gait training, therapeutic exercises, neuromuscular re-education, patient and family training/education and therapeutic activities        Frequency/Duration: Patient will be followed by physical therapy:  3-5x/week to address goals.     Recommendation for discharge: (in order for the patient to meet his/her long term goals)  HHPT home w/ 24 hr A, vs SNF if pt is unable to have this    This discharge recommendation:  Has been made in collaboration with the attending provider and/or case management    IF patient discharges home will need the following DME: rolling walker         SUBJECTIVE:   Patient agreeable to participation in therapy    OBJECTIVE DATA SUMMARY:   HISTORY:    Past Medical History:   Diagnosis Date    Diabetes ([address]     Hypercholesterolemia     Hypertension      Past Surgical History:   Procedure Laterality Date    HX ORTHOPAEDIC  09/2018    Hip repair        Home Situation  Home Environment: Private residence  # Steps to Enter: 3  Rails to Enter: Yes  Hand Rails : Bilateral  Wheelchair Ramp: Yes (Uses steps)  One/Two Story Residence: One story  Living Alone: No  Support Systems: Child(candice)  Patient Expects to be Discharged to[de-identified] Home  Current DME Used/Available at Home: Crutches    EXAMINATION/PRESENTATION/DECISION MAKING:   Critical Behavior:  Neurologic State: Alert  Orientation Level: Oriented to person,Oriented to place,Oriented to time  Cognition: Follows commands  Safety/Judgement: Awareness of environment  Hearing:  Range Of Motion:  AROM: Within functional limits (Grossly WFL throughout BLE during functional mobility)                       Strength:    Strength: Within functional limits (4+/5 knee ext B, 4+/5 ankle DF, 3+/5 B hip flex)                    Tone & Sensation:                  Sensation: Intact               Coordination:     Vision:      Functional Mobility:  Bed Mobility:     Supine to Sit: supervision  Sit to Supine: supervision  Scooting: Supervision  Transfers:  Sit to Stand: stand by A  Stand to Sit: stand by A                       Balance:   Sitting: Intact; Without support  Standing: Impaired; Without support  Standing - Static: Fair (Initially)  Standing - Dynamic : Fair  Ambulation/Gait Training:  Distance (ft): 140 Feet (ft)  Assistive Device: Walker, rolling  Ambulation - Level of Assistance: Minimal assistance;Stand-by assistance     Gait Description (WDL): Exceptions to WDL  Gait Abnormalities:  (decr stride length B, mild path veering w/o RW, no buckling)                                         Functional Measure:  [facility] [address] [address] AM-PAC 6 Clicks         Basic Mobility Inpatient Short Form  How much difficulty does the patient currently have. .. Unable A Lot A Little None   1. Turning over in bed (including adjusting bedclothes, sheets and blankets)? [] 1   [] 2   [] 3   [x] 4   2.   Sitting down on and standing up from a chair with arms ( e.g., wheelchair, bedside commode, etc.)   [] 1   [] 2   [] 3   [x] 4   3. Moving from lying on back to sitting on the side of the bed? [] 1   [] 2   [] 3   [x] 4          How much help from another person does the patient currently need. .. Total A Lot A Little None   4. Moving to and from a bed to a chair (including a wheelchair)? [] 1   [] 2   [] 3   [x] 4   5. Need to walk in hospital room? [] 1   [] 2   [x] 3   [] 4   6. Climbing 3-5 steps with a railing? [] 1   [] 2   [x] 3   [] 4   © 2007, Trustees of [address] [place] [address] [address] [address], under license to [employer]. All rights reserved     Score:  Initial: 22/24 Most Recent: (Date: 5/27/22 )   Interpretation of Tool:  Represents activities that are increasingly more difficult (i.e. Bed mobility, Transfers, Gait). Score 24 23 22-20 19-15 14-10 9-7 6   Modifier CH CI CJ CK CL CM CN         Physical Therapy Evaluation Charge Determination   History Examination Presentation Decision-Making   MEDIUM  Complexity : 1-2 comorbidities / personal factors will impact the outcome/ POC  LOW Complexity : 1-2 Standardized tests and measures addressing body structure, function, activity limitation and / or participation in recreation  LOW Complexity : Stable, uncomplicated  Other outcome measures [facility] 6  low      Based on the above components, the patient evaluation is determined to be of the following complexity level: LOW     Pain Rating:  No pain was reported during session    Activity Tolerance:   Good    After treatment patient left in no apparent distress:   Sitting in chair and Call bell within reach and RN and CM updated. GOALS:    Problem: Mobility Impaired (Adult and Pediatric)  Goal: *Acute Goals and Plan of Care (Insert Text)  Description:   Pt will perform transfers with mod I in 7 days. Pt will amb >200 feet with LRAD safely with mod I in 7 days. Pt will ascend/descend 3 steps with B handrails and stand by Ain 7 days to safely enter home.      Outcome: Not Met COMMUNICATION/EDUCATION:   The patients plan of care was discussed with: Registered nurse and Case management. Fall prevention education was provided and the patient/caregiver indicated understanding. and Patient/family have participated as able in goal setting and plan of care.      Thank you for this referral.  Blake Fagan, PT, PT, DPT   Time Calculation: 36 mins

## 2022-06-02 ENCOUNTER — TELEPHONE (OUTPATIENT)
Dept: PRIMARY CARE CLINIC | Age: 77
End: 2022-06-02

## 2022-06-02 NOTE — TELEPHONE ENCOUNTER
Spoke to [email or web address] from [facility], verified that MD will follow patient and signed home health order, verified by MD.

## 2022-06-03 LAB
O+P SPEC MICRO: NORMAL
O+P STL CONC: NORMAL
SPECIMEN SOURCE: NORMAL

## 2022-06-09 ENCOUNTER — OFFICE VISIT (OUTPATIENT)
Dept: PRIMARY CARE CLINIC | Age: 77
End: 2022-06-09
Payer: MEDICARE

## 2022-06-09 VITALS
HEART RATE: 85 BPM | BODY MASS INDEX: 29.21 KG/M2 | SYSTOLIC BLOOD PRESSURE: 152 MMHG | DIASTOLIC BLOOD PRESSURE: 90 MMHG | OXYGEN SATURATION: 96 % | TEMPERATURE: 97.7 F | WEIGHT: 148.8 LBS | RESPIRATION RATE: 16 BRPM | HEIGHT: 60 IN

## 2022-06-09 DIAGNOSIS — D64.9 NORMOCYTIC ANEMIA: ICD-10-CM

## 2022-06-09 DIAGNOSIS — N18.31 STAGE 3A CHRONIC KIDNEY DISEASE (HCC): ICD-10-CM

## 2022-06-09 DIAGNOSIS — I10 ESSENTIAL HYPERTENSION: ICD-10-CM

## 2022-06-09 DIAGNOSIS — N17.0 ATN (ACUTE TUBULAR NECROSIS) (HCC): ICD-10-CM

## 2022-06-09 DIAGNOSIS — Z91.81 AT RISK FOR FALLS: ICD-10-CM

## 2022-06-09 DIAGNOSIS — N18.30 TYPE 2 DIABETES MELLITUS WITH STAGE 3 CHRONIC KIDNEY DISEASE, WITHOUT LONG-TERM CURRENT USE OF INSULIN, UNSPECIFIED WHETHER STAGE 3A OR 3B CKD (HCC): ICD-10-CM

## 2022-06-09 DIAGNOSIS — Z09 HOSPITAL DISCHARGE FOLLOW-UP: Primary | ICD-10-CM

## 2022-06-09 DIAGNOSIS — E11.22 TYPE 2 DIABETES MELLITUS WITH STAGE 3 CHRONIC KIDNEY DISEASE, WITHOUT LONG-TERM CURRENT USE OF INSULIN, UNSPECIFIED WHETHER STAGE 3A OR 3B CKD (HCC): ICD-10-CM

## 2022-06-09 DIAGNOSIS — Q64.79 STRUCTURAL ABNORMALITY OF BLADDER: ICD-10-CM

## 2022-06-09 DIAGNOSIS — N30.01 ACUTE CYSTITIS WITH HEMATURIA: ICD-10-CM

## 2022-06-09 DIAGNOSIS — M62.81 MUSCLE WEAKNESS (GENERALIZED): ICD-10-CM

## 2022-06-09 PROCEDURE — 99495 TRANSJ CARE MGMT MOD F2F 14D: CPT | Performed by: FAMILY MEDICINE

## 2022-06-09 RX ORDER — DEXTROMETHORPHAN HYDROBROMIDE, GUAIFENESIN 5; 100 MG/5ML; MG/5ML
650 LIQUID ORAL
Qty: 100 TABLET | Refills: 1 | Status: SHIPPED | OUTPATIENT
Start: 2022-06-09

## 2022-06-09 RX ORDER — AMLODIPINE BESYLATE 5 MG/1
5 TABLET ORAL DAILY
Qty: 30 TABLET | Refills: 2 | Status: SHIPPED | OUTPATIENT
Start: 2022-06-09 | End: 2022-09-21 | Stop reason: SDUPTHER

## 2022-06-09 RX ORDER — GLIPIZIDE 2.5 MG/1
2.5 TABLET, EXTENDED RELEASE ORAL DAILY
Qty: 30 TABLET | Refills: 2 | Status: SHIPPED | OUTPATIENT
Start: 2022-06-09 | End: 2022-09-21 | Stop reason: SDUPTHER

## 2022-06-09 NOTE — PROGRESS NOTES
Zoe Salazar (: 1945) is a 77 y.o. female, established patient, here for evaluation of the following chief complaint(s):  ED Follow-up (Kidney issues )       ASSESSMENT/PLAN:  Below is the assessment and plan developed based on review of pertinent history, physical exam, labs, studies, and medications. 1. Hospital discharge follow-up  Referring patient to nephrology for CKD, BMP early next week to recheck kidney function, adding amlodipine 5 mg daily for blood pressure control as hydrochlorothiazide was discontinued and blood pressure is elevated. Start glipizide XL 2.5 mg for blood sugar control. Referring to home health for ADL care. Fall precautions discussed, patient declines DME for ambulation assistance. Mobic discontinued, advised against NSAID use, acetaminophen 650 mg as needed prescribed for osteoarthritis pain. 2. Essential hypertension  Chronic  -     amLODIPine (NORVASC) 5 mg tablet; Take 1 Tablet by mouth daily. , Normal, Disp-30 Tablet, R-2  -     METABOLIC PANEL, BASIC; Future  -     REFERRAL TO HOME HEALTH  3. Type 2 diabetes mellitus with stage 3 chronic kidney disease, without long-term current use of insulin, unspecified whether stage 3a or 3b CKD (HCC)  Chronic  -     glipiZIDE SR (GLUCOTROL XL) 2.5 mg CR tablet; Take 1 Tablet by mouth daily. , Normal, Disp-30 Tablet, R-2  -     REFERRAL TO HOME HEALTH  4. Stage 3a chronic kidney disease ([facility]  Chronic  -     REFERRAL TO NEPHROLOGY  -     REFERRAL TO HOME HEALTH  5. Normocytic anemia  Chronic  -     REFERRAL TO HOME HEALTH  6. Muscle weakness (generalized)  Chronic  -     REFERRAL TO HOME HEALTH  7. Structural abnormality of the bladder  Incidental finding on kidney ultrasound. Referral to OB/GYN for further evaluation. 8. ATN (acute tubular necrosis) (HCC)  Resolved  -     METABOLIC PANEL, BASIC; Future  Suspected to be due to volume contraction from hydrochlorothiazide and NSAID nephropathy.   9. Acute cystitis with hematuria  Resolved  10. At risk for falls  -     [facility]      Return for pcp f/u 8/23/22. SUBJECTIVE/OBJECTIVE:  HPI    This is a pleasant 71-year-old female with past medical history notable for diabetes, hypercholesterolemia and hypertension who presented to the hospital with weakness, cough and diarrhea. Date of admission 5/24/2022  Date of discharge 5/27/2022  Faith Vidal    Patient was advised to go to the ER when office labs revealed CHASIDY. In the ED, vitals were unremarkable, she had a slightly elevated white count of 12.4, hemoglobin of 10.9, potassium of 3.  2, creatinine of 2.73, bun of 10 and alk phos of 137. Her urine was positive for leukocyte esterase and hematuria. Nephrology was consulted. A retroperitoneal ultrasound showed a nodular component protruding along the bladder base without hydronephrosis. OB/GYN consulted and recommended outpatient follow-up. Patient had no more diarrhea during the hospitalization. She was alert and oriented. Her hydrochlorothiazide was discontinued by nephrology. Patient was given warnings about continued NSAID use and Meloxicam was discontinued. Negative blood and urine cultures. Patient states that at the time of discharge a rolling walker was recommended. She has a handicap son she cares for who is wheelchair-bound. She is unable to use the walker and push the wheelchair together at the same time. At this time, she does have an unstable gait due to osteoarthritis of her hips, but still refuses to use a rolling walker. She is looking at permanent placement for her son in a group home. After that is sorted out, patient would be willing to use DME for walking assist.    Patient states she is still feeling weak. She has unable to cook by herself and do other household chores. She is requesting help to assist her with her activities of daily living.     No Known Allergies  Current Outpatient Medications   Medication Sig    glipiZIDE SR (GLUCOTROL XL) 2.5 mg CR tablet Take 1 Tablet by mouth daily.  amLODIPine (NORVASC) 5 mg tablet Take 1 Tablet by mouth daily.  losartan (COZAAR) 50 mg tablet Take 1 Tablet by mouth daily.  guaiFENesin (Mucinex) 1,200 mg Ta12 ER tablet Take 1 Tablet by mouth two (2) times a day.  ipratropium-albuteroL (COMBIVENT RESPIMAT)  mcg/actuation inhaler Take 1 Puff by inhalation every six (6) hours.  rOPINIRole (REQUIP) 2 mg tablet Take 1 Tablet by mouth nightly.  Omeprazole delayed release (PRILOSEC D/R) 20 mg tablet Take 1 Tablet by mouth daily. Indications: gastroesophageal reflux disease    ezetimibe (ZETIA) 10 mg tablet Take 1 Tablet by mouth daily.  estradioL (ESTRACE) 0.01 % (0.1 mg/gram) vaginal cream insert 1 gram vaginally three times a week    cholecalciferol (VITAMIN D3) (1000 Units /25 mcg) tablet Take 1 Tablet by mouth daily. Indications: low vitamin D levels    mirtazapine (REMERON) 15 mg tablet Take 1 Tablet by mouth nightly.  meclizine (ANTIVERT) 25 mg tablet take 1 tablet by mouth three times a day if needed for dizziness for up to 10 days     No current facility-administered medications for this visit. Past Medical History:   Diagnosis Date    [facility]     Hypercholesterolemia     Hypertension      Past Surgical History:   Procedure Laterality Date    HX ORTHOPAEDIC  09/2018    Hip repair      Family History   Problem Relation Age of Onset    Diabetes Other     Heart Disease Other     Lung Cancer Other      Social History     Tobacco Use   Smoking Status Never Smoker   Smokeless Tobacco Never Used         Review of Systems   All other systems reviewed and are negative.         BP (!) 152/90 (BP 1 Location: Left upper arm, BP Patient Position: Sitting, BP Cuff Size: Adult)   Pulse 85   Temp 97.7 °F (36.5 °C) (Temporal)   Resp 16   Ht 5' (1.524 m)   Wt 148 lb 12.8 oz (67.5 kg)   SpO2 96%   BMI 29.06 kg/m² Physical Exam  Vitals reviewed. Constitutional:       Appearance: She is ill-appearing. Eyes:      Conjunctiva/sclera: Conjunctivae normal.   Cardiovascular:      Rate and Rhythm: Normal rate and regular rhythm. Pulses: Normal pulses. Heart sounds: Normal heart sounds. Pulmonary:      Effort: Pulmonary effort is normal.      Breath sounds: Normal breath sounds. Abdominal:      General: Bowel sounds are normal.      Palpations: Abdomen is soft. Musculoskeletal:         General: Normal range of motion. Cervical back: Neck supple. Skin:     General: Skin is warm. Capillary Refill: Capillary refill takes less than 2 seconds. Neurological:      General: No focal deficit present. Mental Status: She is alert and oriented to person, place, and time. Motor: Weakness present. Gait: Gait abnormal.   Psychiatric:         Mood and Affect: Mood normal.             On this date 06/09/2022 I have spent 45 minutes reviewing previous notes, test results and face to face with the patient discussing the diagnosis and importance of compliance with the treatment plan as well as documenting on the day of the visit. An electronic signature was used to authenticate this note.   -- Arnold Harris MD   [facility] [address]

## 2022-06-09 NOTE — PATIENT INSTRUCTIONS
You have new medications prescribed today: For blood sugars: Glipizide 2.5 mg, take one tablet with breakfast  For blood pressure: amlodipine 5 mg, take one tablet in the morning along with losartan. Check blood pressure daily. Schedule a lab appointment to have your kidney labs checked next Monday.

## 2022-06-09 NOTE — PROGRESS NOTES
Chief Complaint   Patient presents with    ED Follow-up     Kidney issues      Visit Vitals  BP (!) 152/90 (BP 1 Location: Left upper arm, BP Patient Position: Sitting, BP Cuff Size: Adult)   Pulse 85   Temp 97.7 °F (36.5 °C) (Temporal)   Resp 16   Ht 5' (1.524 m)   Wt 148 lb 12.8 oz (67.5 kg)   SpO2 96%   BMI 29.06 kg/m²     1. \"Have you been to the ER, urgent care clinic since your last visit? Hospitalized since your last visit? \" Yes, May 27, 2022    2. \"Have you seen or consulted any other health care providers outside of the [address] [place] [address] since your last visit? \" No     3. For patients aged 39-70: Has the patient had a colonoscopy / FIT/ Cologuard? Yes - no Care Gap present      If the patient is female:    4. For patients aged 41-77: Has the patient had a mammogram within the past 2 years? Yes - no Care Gap present      5. For patients aged 21-65: Has the patient had a pap smear?  Yes - no Care Gap present

## 2022-06-23 PROBLEM — N39.0 UTI (URINARY TRACT INFECTION): Status: RESOLVED | Noted: 2022-05-24 | Resolved: 2022-06-23

## 2022-07-05 ENCOUNTER — TELEPHONE (OUTPATIENT)
Dept: PRIMARY CARE CLINIC | Age: 77
End: 2022-07-05

## 2022-07-08 ENCOUNTER — TELEPHONE (OUTPATIENT)
Dept: PRIMARY CARE CLINIC | Age: 77
End: 2022-07-08

## 2022-07-12 DIAGNOSIS — I10 ESSENTIAL HYPERTENSION: Primary | ICD-10-CM

## 2022-07-12 NOTE — TELEPHONE ENCOUNTER
Spoke to [place] at home health. Patient is refusing continuation of home health services and PT. Patient will be discharged from home health per patient wishes.

## 2022-07-12 NOTE — TELEPHONE ENCOUNTER
[facility] has called and stated that Ms. Lia Bear has declined there services and will be discharging the pt. Wanted Dr. Lisa Serrano to reach out before discharging.  Blossom Crowe - [phone]

## 2022-07-12 NOTE — TELEPHONE ENCOUNTER
Medication refill, NOV 8/23/22 Detail Level: Detailed Duration Of Freeze Thaw-Cycle (Seconds): 15-20 Consent: The patient's consent was obtained including but not limited to risks of crusting, scabbing, blistering, scarring, darker or lighter pigmentary change, recurrence, incomplete removal and infection. Medical Necessity Clause: This procedure was medically necessary because the lesions that were treated were: Add 52 Modifier (Optional): no Post-Care Instructions: I reviewed with the patient in detail post-care instructions. Patient knows to avoid picking at any of the treated lesions. Pt may apply Vaseline and bandage to crusted or scabbing areas if needed for comfort. Medical Necessity Information: It is in your best interest to select a reason for this procedure from the list below. All of these items fulfill various CMS LCD requirements except the new and changing color options. Number Of Freeze-Thaw Cycles: 1 freeze-thaw cycle decreased sequencing ability/decreased weight-shifting ability

## 2022-07-13 RX ORDER — LOSARTAN POTASSIUM 50 MG/1
50 TABLET ORAL DAILY
Qty: 30 TABLET | Refills: 2 | Status: SHIPPED | OUTPATIENT
Start: 2022-07-13 | End: 2022-10-10 | Stop reason: SDUPTHER

## 2022-07-23 LAB
BUN SERPL-MCNC: 15 MG/DL (ref 8–27)
BUN/CREAT SERPL: 22 (ref 12–28)
CALCIUM SERPL-MCNC: 9 MG/DL (ref 8.7–10.3)
CHLORIDE SERPL-SCNC: 105 MMOL/L (ref 96–106)
CO2 SERPL-SCNC: 22 MMOL/L (ref 20–29)
CREAT SERPL-MCNC: 0.68 MG/DL (ref 0.57–1)
EGFR: 91 ML/MIN/1.73
GLUCOSE SERPL-MCNC: 86 MG/DL (ref 65–99)
POTASSIUM SERPL-SCNC: 4.4 MMOL/L (ref 3.5–5.2)
SODIUM SERPL-SCNC: 140 MMOL/L (ref 134–144)

## 2022-07-28 NOTE — PROGRESS NOTES
Please notify patient that her kidney function has recovered. I will see her for Medicare wellness on 8/23/2022.

## 2022-09-09 DIAGNOSIS — K21.9 GERD WITHOUT ESOPHAGITIS: ICD-10-CM

## 2022-09-09 DIAGNOSIS — E78.2 MIXED HYPERLIPIDEMIA: ICD-10-CM

## 2022-09-09 DIAGNOSIS — M15.9 PRIMARY OSTEOARTHRITIS INVOLVING MULTIPLE JOINTS: ICD-10-CM

## 2022-09-12 RX ORDER — MELOXICAM 7.5 MG/1
TABLET ORAL
Qty: 90 TABLET | Refills: 3 | OUTPATIENT
Start: 2022-09-12

## 2022-09-12 RX ORDER — OMEPRAZOLE 20 MG/1
CAPSULE, DELAYED RELEASE ORAL
Qty: 90 CAPSULE | Refills: 0 | Status: SHIPPED | OUTPATIENT
Start: 2022-09-12 | End: 2022-10-10 | Stop reason: SDUPTHER

## 2022-09-12 RX ORDER — EZETIMIBE 10 MG/1
TABLET ORAL
Qty: 90 TABLET | Refills: 0 | Status: SHIPPED | OUTPATIENT
Start: 2022-09-12 | End: 2022-10-10 | Stop reason: SDUPTHER

## 2022-09-21 DIAGNOSIS — N18.30 TYPE 2 DIABETES MELLITUS WITH STAGE 3 CHRONIC KIDNEY DISEASE, WITHOUT LONG-TERM CURRENT USE OF INSULIN, UNSPECIFIED WHETHER STAGE 3A OR 3B CKD (HCC): ICD-10-CM

## 2022-09-21 DIAGNOSIS — I10 ESSENTIAL HYPERTENSION: ICD-10-CM

## 2022-09-21 DIAGNOSIS — E11.22 TYPE 2 DIABETES MELLITUS WITH STAGE 3 CHRONIC KIDNEY DISEASE, WITHOUT LONG-TERM CURRENT USE OF INSULIN, UNSPECIFIED WHETHER STAGE 3A OR 3B CKD (HCC): ICD-10-CM

## 2022-09-21 RX ORDER — GLIPIZIDE 2.5 MG/1
2.5 TABLET, EXTENDED RELEASE ORAL DAILY
Qty: 30 TABLET | Refills: 2 | Status: SHIPPED | OUTPATIENT
Start: 2022-09-21 | End: 2022-10-10 | Stop reason: SDUPTHER

## 2022-09-21 RX ORDER — AMLODIPINE BESYLATE 5 MG/1
5 TABLET ORAL DAILY
Qty: 30 TABLET | Refills: 2 | Status: SHIPPED | OUTPATIENT
Start: 2022-09-21 | End: 2022-10-10 | Stop reason: SDUPTHER

## 2022-10-10 ENCOUNTER — OFFICE VISIT (OUTPATIENT)
Dept: PRIMARY CARE CLINIC | Age: 77
End: 2022-10-10
Payer: MEDICARE

## 2022-10-10 VITALS
TEMPERATURE: 98.6 F | DIASTOLIC BLOOD PRESSURE: 86 MMHG | OXYGEN SATURATION: 97 % | WEIGHT: 152.8 LBS | SYSTOLIC BLOOD PRESSURE: 144 MMHG | RESPIRATION RATE: 18 BRPM | HEIGHT: 60 IN | HEART RATE: 104 BPM | BODY MASS INDEX: 30 KG/M2

## 2022-10-10 DIAGNOSIS — E11.22 TYPE 2 DIABETES MELLITUS WITH STAGE 3 CHRONIC KIDNEY DISEASE, WITHOUT LONG-TERM CURRENT USE OF INSULIN, UNSPECIFIED WHETHER STAGE 3A OR 3B CKD (HCC): ICD-10-CM

## 2022-10-10 DIAGNOSIS — N18.30 TYPE 2 DIABETES MELLITUS WITH STAGE 3 CHRONIC KIDNEY DISEASE, WITHOUT LONG-TERM CURRENT USE OF INSULIN, UNSPECIFIED WHETHER STAGE 3A OR 3B CKD (HCC): ICD-10-CM

## 2022-10-10 DIAGNOSIS — E78.2 MIXED HYPERLIPIDEMIA: ICD-10-CM

## 2022-10-10 DIAGNOSIS — F32.2 CURRENT SEVERE EPISODE OF MAJOR DEPRESSIVE DISORDER WITHOUT PSYCHOTIC FEATURES, UNSPECIFIED WHETHER RECURRENT (HCC): ICD-10-CM

## 2022-10-10 DIAGNOSIS — G89.29 CHRONIC PAIN OF BOTH SHOULDERS: ICD-10-CM

## 2022-10-10 DIAGNOSIS — M25.551 RIGHT HIP PAIN: ICD-10-CM

## 2022-10-10 DIAGNOSIS — E55.9 VITAMIN D DEFICIENCY: ICD-10-CM

## 2022-10-10 DIAGNOSIS — I10 ESSENTIAL HYPERTENSION: ICD-10-CM

## 2022-10-10 DIAGNOSIS — Z00.00 MEDICARE ANNUAL WELLNESS VISIT, SUBSEQUENT: Primary | ICD-10-CM

## 2022-10-10 DIAGNOSIS — M25.512 CHRONIC PAIN OF BOTH SHOULDERS: ICD-10-CM

## 2022-10-10 DIAGNOSIS — M15.9 PRIMARY OSTEOARTHRITIS INVOLVING MULTIPLE JOINTS: ICD-10-CM

## 2022-10-10 DIAGNOSIS — G25.81 RESTLESS LEG: ICD-10-CM

## 2022-10-10 DIAGNOSIS — Z23 ENCOUNTER FOR IMMUNIZATION: ICD-10-CM

## 2022-10-10 DIAGNOSIS — M25.511 CHRONIC PAIN OF BOTH SHOULDERS: ICD-10-CM

## 2022-10-10 DIAGNOSIS — D22.9 NEVUS: ICD-10-CM

## 2022-10-10 DIAGNOSIS — K21.9 GERD WITHOUT ESOPHAGITIS: ICD-10-CM

## 2022-10-10 PROCEDURE — 90694 VACC AIIV4 NO PRSRV 0.5ML IM: CPT | Performed by: NURSE PRACTITIONER

## 2022-10-10 PROCEDURE — G8400 PT W/DXA NO RESULTS DOC: HCPCS | Performed by: NURSE PRACTITIONER

## 2022-10-10 PROCEDURE — 2022F DILAT RTA XM EVC RTNOPTHY: CPT | Performed by: NURSE PRACTITIONER

## 2022-10-10 PROCEDURE — G8432 DEP SCR NOT DOC, RNG: HCPCS | Performed by: NURSE PRACTITIONER

## 2022-10-10 PROCEDURE — G8536 NO DOC ELDER MAL SCRN: HCPCS | Performed by: NURSE PRACTITIONER

## 2022-10-10 PROCEDURE — 1090F PRES/ABSN URINE INCON ASSESS: CPT | Performed by: NURSE PRACTITIONER

## 2022-10-10 PROCEDURE — G8427 DOCREV CUR MEDS BY ELIG CLIN: HCPCS | Performed by: NURSE PRACTITIONER

## 2022-10-10 PROCEDURE — G0008 ADMIN INFLUENZA VIRUS VAC: HCPCS | Performed by: NURSE PRACTITIONER

## 2022-10-10 PROCEDURE — 1101F PT FALLS ASSESS-DOCD LE1/YR: CPT | Performed by: NURSE PRACTITIONER

## 2022-10-10 PROCEDURE — 1123F ACP DISCUSS/DSCN MKR DOCD: CPT | Performed by: NURSE PRACTITIONER

## 2022-10-10 PROCEDURE — G8417 CALC BMI ABV UP PARAM F/U: HCPCS | Performed by: NURSE PRACTITIONER

## 2022-10-10 PROCEDURE — 3044F HG A1C LEVEL LT 7.0%: CPT | Performed by: NURSE PRACTITIONER

## 2022-10-10 PROCEDURE — 3017F COLORECTAL CA SCREEN DOC REV: CPT | Performed by: NURSE PRACTITIONER

## 2022-10-10 PROCEDURE — G8753 SYS BP > OR = 140: HCPCS | Performed by: NURSE PRACTITIONER

## 2022-10-10 PROCEDURE — 99214 OFFICE O/P EST MOD 30 MIN: CPT | Performed by: NURSE PRACTITIONER

## 2022-10-10 PROCEDURE — G0439 PPPS, SUBSEQ VISIT: HCPCS | Performed by: NURSE PRACTITIONER

## 2022-10-10 PROCEDURE — G8754 DIAS BP LESS 90: HCPCS | Performed by: NURSE PRACTITIONER

## 2022-10-10 RX ORDER — MELATONIN
1000 DAILY
Qty: 90 TABLET | Refills: 3 | Status: SHIPPED | OUTPATIENT
Start: 2022-10-10

## 2022-10-10 RX ORDER — AMLODIPINE BESYLATE 5 MG/1
5 TABLET ORAL DAILY
Qty: 90 TABLET | Refills: 2 | Status: SHIPPED | OUTPATIENT
Start: 2022-10-10

## 2022-10-10 RX ORDER — LOSARTAN POTASSIUM 50 MG/1
50 TABLET ORAL DAILY
Qty: 90 TABLET | Refills: 2 | Status: SHIPPED | OUTPATIENT
Start: 2022-10-10

## 2022-10-10 RX ORDER — ROPINIROLE 2 MG/1
2 TABLET, FILM COATED ORAL
Qty: 90 TABLET | Refills: 2 | Status: SHIPPED | OUTPATIENT
Start: 2022-10-10

## 2022-10-10 RX ORDER — GLIPIZIDE 2.5 MG/1
2.5 TABLET, EXTENDED RELEASE ORAL DAILY
Qty: 90 TABLET | Refills: 2 | Status: SHIPPED | OUTPATIENT
Start: 2022-10-10

## 2022-10-10 RX ORDER — MIRTAZAPINE 15 MG/1
15 TABLET, FILM COATED ORAL
Qty: 90 TABLET | Refills: 2 | Status: SHIPPED | OUTPATIENT
Start: 2022-10-10

## 2022-10-10 RX ORDER — OMEPRAZOLE 20 MG/1
CAPSULE, DELAYED RELEASE ORAL
Qty: 90 CAPSULE | Refills: 2 | Status: SHIPPED | OUTPATIENT
Start: 2022-10-10

## 2022-10-10 RX ORDER — EZETIMIBE 10 MG/1
TABLET ORAL
Qty: 90 TABLET | Refills: 2 | Status: SHIPPED | OUTPATIENT
Start: 2022-10-10

## 2022-10-10 NOTE — PROGRESS NOTES
Chief Complaint   Patient presents with    Annual Wellness Visit    Diabetes       Visit Vitals  BP (!) 144/86 (BP 1 Location: Left upper arm, BP Patient Position: Sitting, BP Cuff Size: Small adult)   Pulse (!) 104   Temp 98.6 °F (37 °C) (Oral)   Resp 18   Ht 5' (1.524 m)   Wt 152 lb 12.8 oz (69.3 kg)   SpO2 97%   BMI 29.84 kg/m²       1. Have you been to the ER, urgent care clinic since your last visit? Hospitalized since your last visit? [facility] 9/2022 for kidney problems    2. Have you seen or consulted any other health care providers outside of the [address] [place] [address] since your last visit? Include any pap smears or colon screening.   no

## 2022-10-10 NOTE — PROGRESS NOTES
Kandace Ellis is a 77 y.o. female presents for Medicare wellness visit. This is a Subsequent Medicare Annual Wellness Visit (AWV), (Performed more than 12 months after effective date of Medicare Part B enrollment and 12 months after last preventive visit.)    I have reviewed the patient's medical history in detail and updated the computerized patient record. History obtained from: the patient. female  77 y.o. WHITE/NON-  Depression Risk Factor Screening:     3 most recent PHQ Screens 10/10/2022   Little interest or pleasure in doing things Several days   Feeling down, depressed, irritable, or hopeless Several days   Total Score PHQ 2 2   Trouble falling or staying asleep, or sleeping too much -   Feeling tired or having little energy -   Poor appetite, weight loss, or overeating -   Feeling bad about yourself - or that you are a failure or have let yourself or your family down -   Trouble concentrating on things such as school, work, reading, or watching TV -   Moving or speaking so slowly that other people could have noticed; or the opposite being so fidgety that others notice -   Thoughts of being better off dead, or hurting yourself in some way -   PHQ 9 Score -     [id number] Suicide Severity Rating Scale 5/24/2022   1) Within the past month, have you wished you were dead or wished you could go to sleep and not wake up? No   2) Have you actually had any thoughts of killing yourself? No   6) Have you ever done anything, started to do anything, or prepared to do anything to end your life? No       Fall Risk Factor Screening:     Fall Risk Assessment, last 12 mths 10/10/2022   Able to walk? Yes   Fall in past 12 months? 0   Do you feel unsteady?  0   Are you worried about falling 0   Is the gait abnormal? -   Number of falls in past 12 months -   Fall with injury? -       Alcohol Risk Screen    Do you average more than 1 drink per night or more than 7 drinks a week:  No    On any one occasion in the past three months have you have had more than 3 drinks containing alcohol:  No         Functional Ability and Level of Safety:    Hearing: Hearing is good. Activities of Daily Living: The home contains: no safety equipment. Patient does total self care      Ambulation: with a limp secondary to arthritis. Abuse Screen:  Patient is not abused     Lives at home and takes care of her disabled son and one other son. History and Pertinent Exam   Patient is due for chronic medical conditions and/or has acute concerns in addition to the medicare wellness visit and agrees to do both, understanding there may be a copay for the additional services provided. Other Concerns today:     Hypertension: Patient was previously on losartan-HCTZ but had kidney injury and HCTZ was removed earlier this year. She was started on amlodipine in its place. Notes that she was not taking losartan because pharmacy did not fill for her. Hyperlipidemia: Mixed hyperlipidemia well controlled on ezetimibe. Acid Reflux: Patients GERD has been well controlled on prilosec. Vitamin D deficiency: She has been using daily vitamin D supplement. OA: Primarily noted in shoulder, knees and back. Had kidney injury earlier this year and is now only using tylenol. Was on celebrex in the past.   Patient is asking to go back to Physical therapy for hip pain and bilateral shoulder pain. Lump: patient c/o lump to right arm. Patient reported it has been there for years but is now getting bigger and starting to be bothersome. Would like to have it removed. Moles: patient c/o multiple moles to chest and neck that are changing in size and color. She would like these removed. Does not have a dermatologist.     Health & Diet:   Please describe your diet habits: Regular   Do you get 5 servings of fruits or vegetables daily? yes  Do you exercise regularly?  no    Review of Systems   Constitutional:  Negative for malaise/fatigue and weight loss. Eyes:  Negative for blurred vision and double vision. Respiratory:  Negative for shortness of breath. Cardiovascular:  Negative for chest pain and palpitations. Neurological:  Negative for dizziness, tingling, tremors and headaches. Psychiatric/Behavioral:  The patient is not nervous/anxious and does not have insomnia. Reviewed PmHx, FmHx, SocHx as well as meds and allergies, updated and dated in the chart. Patient Active Problem List   Diagnosis Code    Essential hypertension I10    Mixed hyperlipidemia E78.2    Vitamin D deficiency E55.9    GERD without esophagitis K21.9    Restless leg G25.81    At risk for falls Z91.81    Paranoid ([facility]) F22    ATN (acute tubular necrosis) ([facility]) N17.0    UTI (urinary tract infection) N39.0    CHASIDY (acute kidney injury) ([facility] [address] N17.9    Other female genital prolapse N81.89    Vaginal pessary present Z96.0    Type 2 diabetes mellitus with stage 3 chronic kidney disease, without long-term current use of insulin ([facility]) E11.22, N18.30    Normocytic anemia D64.9    Stage 3a chronic kidney disease ([facility]) N18.31    Muscle weakness (generalized) M62.81    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 90 Tablet 2    rOPINIRole (REQUIP) 2 mg tablet Take 1 Tablet by mouth nightly. 90 Tablet 2    mirtazapine (REMERON) 15 mg tablet Take 1 Tablet by mouth nightly. 90 Tablet 2    glipiZIDE SR (GLUCOTROL XL) 2.5 mg CR tablet Take 1 Tablet by mouth daily. 90 Tablet 2    ezetimibe (ZETIA) 10 mg tablet TAKE 1 TABLET DAILY 90 Tablet 2    amLODIPine (NORVASC) 5 mg tablet Take 1 Tablet by mouth daily.  90 Tablet 2 cholecalciferol (VITAMIN D3) (1000 Units /25 mcg) tablet Take 1 Tablet by mouth daily. Indications: low vitamin D levels 90 Tablet 3    omeprazole (PRILOSEC) 20 mg capsule TAKE 1 CAPSULE DAILY FOR GASTROESOPHAGEAL REFLUX DISEASE 90 Capsule 2    acetaminophen (TYLENOL) 650 mg TbER Take 1 Tablet by mouth every eight (8) hours as needed for Pain. 100 Tablet 1    estradioL (ESTRACE) 0.01 % (0.1 mg/gram) vaginal cream insert 1 gram vaginally three times a week 42.5 g 1    meclizine (ANTIVERT) 25 mg tablet take 1 tablet by mouth three times a day if needed for dizziness for up to 10 days 30 Tablet 1     Family History   Problem Relation Age of Onset    Diabetes Other     Heart Disease Other     Lung Cancer Other      Social History     Tobacco Use    Smoking status: Never    Smokeless tobacco: Never   Substance Use Topics    Alcohol use: Yes     Comment: occasional          BP Readings from Last 3 Encounters:   10/10/22 (!) 144/86   06/09/22 (!) 152/90   05/27/22 137/74      Wt Readings from Last 3 Encounters:   10/10/22 152 lb 12.8 oz (69.3 kg)   06/09/22 148 lb 12.8 oz (67.5 kg)   05/24/22 144 lb (65.3 kg)     Body mass index is 29.84 kg/m². No results found. Physical Exam  Constitutional:       Appearance: Normal appearance. HENT:      Head: Normocephalic and atraumatic. Right Ear: Tympanic membrane, ear canal and external ear normal.      Left Ear: Tympanic membrane, ear canal and external ear normal.      Nose: Nose normal.      Mouth/Throat:      Mouth: Mucous membranes are moist.      Pharynx: Oropharynx is clear. Eyes:      Extraocular Movements: Extraocular movements intact. Right eye: No nystagmus. Left eye: No nystagmus. Conjunctiva/sclera: Conjunctivae normal.      Pupils: Pupils are equal, round, and reactive to light. Cardiovascular:      Rate and Rhythm: Normal rate and regular rhythm. Pulses: Normal pulses. Heart sounds: Normal heart sounds.    Pulmonary:      Effort: Pulmonary effort is normal.      Breath sounds: Normal breath sounds. Abdominal:      General: Abdomen is flat. Bowel sounds are normal.      Palpations: Abdomen is soft. Musculoskeletal:         General: Normal range of motion. Cervical back: Normal range of motion and neck supple. Right lower leg: No edema. Left lower leg: No edema. Left ankle: Swelling present. Tenderness present. Skin:     General: Skin is warm and dry. Capillary Refill: Capillary refill takes less than 2 seconds. Findings: Lesion (multiple nevus to chest and neck) present. Comments: Cyst to right forearm. Neurological:      General: No focal deficit present. Mental Status: She is alert and oriented to person, place, and time. Psychiatric:         Mood and Affect: Mood normal.         Thought Content: Thought content normal.         Judgment: Judgment normal.     Was the patient's timed Up & Go test unsteady or longer than 30 seconds? no    Evaluation of Cognitive Function   Mood/affect:  neutral, happy  Orientation: Person, Place, Time and Situation  Appearance: age appropriate  Family member/caregiver input: None  Clock Test and Mini Cog: Normal   Specialists/Care Team   Lakhwinder Main has established care with the following healthcare providers:    Patient Care Team:  Nella Connolly NP as PCP - General (Nurse Practitioner)  Sheila Yates MD as PCP - [facility] Empaneled Provider  Nahid Evans MD (Nephrology)  Chelsie Paul MD (Obstetrics & Gynecology)      [facility] Maintenance Topics with due status: Overdue       Topic Date Due    Foot Exam Q1 Never done    MICROALBUMIN Q1 Never done    Eye Exam Retinal or Dilated Never done    Shingrix Vaccine Age 50> Never done    Bone Densitometry (Dexa) Screening Never done    COVID-19 Vaccine 09/21/2021     Health Maintenance Topics with due status: Not Due       Topic Last Completion Date    DTaP/Tdap/Td series 12/02/2021    Lipid Screen 05/23/2022    A1C test (Diabetic or Prediabetic) 05/25/2022    Colorectal Cancer Screening Combo 05/27/2022    Medicare Yearly Exam 10/10/2022    Depression Monitoring 10/10/2022     Health Maintenance Topics with due status: Completed       Topic Last Completion Date    Pneumococcal 65+ years 10/17/2014    Hepatitis C Screening 04/27/2021    Flu Vaccine 10/10/2022         Colon cancer:  Recommendation: Colonoscopy every 10y or annual FIT test from 50-75 or every 3 year stool DNA based test with consideration of ongoing screening from 76-85. and Up to date or Completed    Lung cancer (LDCT): Recommendation: Yearly LDCT for pts 55-77 w 30-pack year hx and currently smoke or quit <15 yr ago. and Not Indicated    Hepatitis C:  Recommendation: One time screening for all patient's aged 18-79. and Due, ordered    Diabetes:   Recommendation: USPSTF recommends screening ages 38-69 y/o if overweight or obese. Medicare covers screening in those patients who are overweight, obese, have HTN or dyslipiemia, a personal history of gestational diabetes or prior elevated blood sugar, a family hx of DM, or any patient over age 72 and Up to date or Completed    Lipids:   Recommendation: screening for hyperlipidemia every 5 years after age 39 and Due, ordered        PREVENTIVE CARE - FEMALE SCREENINGS     Cervical cancer: Recommendation: Every 3 yr from 21-29 and every 5 yr from 33-67, with Pap and HPV testing. and Not Indicated    Breast cancer: Recommendation:  USPSTF recommends screening mammography every 2 yr from 54-69. The decision to start screening mammography in women prior to age 48 years should be an individual one. Women who place a higher value on the potential benefit than the potential harms may choose to begin biennial screening between the ages of 36 and 52 years.  Medicare covers annual screening mammography, USPSTF also recommends women with a personal or family history of breast, ovarian, tubal, or peritoneal cancer or who have an ancestry (Ashkenazi Taoist) associated with breast cancer susceptibility 1 and 2 (BRCA1/2) gene mutations with an appropriate brief familial risk assessment tool. Women with a positive result on the risk assessment tool should receive genetic counseling and, if indicated after counseling, genetic testing and mammogram is due, ordered    Osteoporosis: Recommendation: Screen all women at 72, earlier if elevated risk and Due, ordered    AAA:   Recommendation: One-time screening if family history of AAA and Not Indicated      IMMUNIZATIONS     Immunization History   Administered Date(s) Administered    COVID-19, PFIZER PURPLE top, DILUTE for use, (age 15 y+), IM, 30mcg/0.3mL 03/30/2021, 04/21/2021    Influenza, FLUAD, (age 72 y+), Adjuvanted 12/02/2021, 10/10/2022    Pneumococcal Conjugate (PCV-13) 01/14/2014    Pneumococcal Polysaccharide (PPSV-23) 10/17/2014    Tdap 12/02/2021       Pneumovax:   Recommendation: PPSV23 once for all >65 and high risk <65  and Up to date or Completed    Prevnar:   Recommendation: PCV13 only if >65 and immunocompromised or residing in a nursing home, or in areas of low childhood Pneumococcal vaccination and Up to date or Completed    Influenza:   Recommendation: Vaccination annually, high dose if 65 or older and Up to date or Completed    Shingrix:  Recommendation: Vaccination 2 shots 2-6 months apart for all age >47 and Up to date or Completed    TDaP:    Recommendation: Vaccination Booster with TDaP every 10 yr. Discussion of Advance Directive   Discussed with Delvis Whelan. Sapphire Marshall her ability to prepare and advance directive in the case that an injury or illness causes her to be unable to make health care decisions.        Date of ACP Conversation: 10/10/22  Persons included in Conversation:  patient  Length of ACP Conversation in minutes:  <16 minutes (Non-Billable)    Authorized Decision Maker (if patient is incapable of making informed decisions): This person is:   Named in Advance Directive or Healthcare Power of             For Patients with Decision Making Capacity:   Values/Goals: Exploration of values, goals, and preferences if recovery is not expected, even with continued medical treatment in the event of:  Imminent death  Severe, permanent brain injury    Conversation Outcomes / Follow-Up Plan:   ACP incomplete - refer to ACP Clinical Specialist    Assessment/Plan   V70.0,     Diagnoses and all orders for this visit:    1. Medicare annual wellness visit, subsequent    2. Primary osteoarthritis involving multiple joints  Comments:  unable to take NSAIDS now due to kidney injury and hospitalization in May. Continue tylenol PRN  Orders:  -     REFERRAL TO PHYSICAL THERAPY    3. Right hip pain  Comments:  referral placed to PT  Orders:  -     REFERRAL TO PHYSICAL THERAPY    4. Chronic pain of both shoulders  Comments:  referral to PT to help with pain secondary to arthritis  Orders:  -     REFERRAL TO PHYSICAL THERAPY    5. Essential hypertension  Comments:  not well controlled. Discussed restarting losartan in addition to amlodipine. Orders:  -     losartan (COZAAR) 50 mg tablet; Take 1 Tablet by mouth daily. -     amLODIPine (NORVASC) 5 mg tablet; Take 1 Tablet by mouth daily. 6. Restless leg  Comments:  stable on requip  Orders:  -     rOPINIRole (REQUIP) 2 mg tablet; Take 1 Tablet by mouth nightly. 7. Current severe episode of major depressive disorder without psychotic features, unspecified whether recurrent ([facility] [address]  Comments:  controlled on remeron  Orders:  -     mirtazapine (REMERON) 15 mg tablet; Take 1 Tablet by mouth nightly. 8. Type 2 diabetes mellitus with stage 3 chronic kidney disease, without long-term current use of insulin, unspecified whether stage 3a or 3b CKD ([facility] [address]  Comments:  checking labs. Orders:  -     glipiZIDE SR (GLUCOTROL XL) 2.5 mg CR tablet;  Take 1 Tablet by mouth daily.  -     CBC WITH AUTOMATED DIFF  -     HEMOGLOBIN A1C WITH EAG  -     LIPID PANEL  -     METABOLIC PANEL, COMPREHENSIVE  -     MICROALBUMIN, UR, RAND W/ MICROALB/CREAT RATIO    9. Mixed hyperlipidemia  Comments:  checking labs  Orders:  -     ezetimibe (ZETIA) 10 mg tablet; TAKE 1 TABLET DAILY    10. Vitamin D deficiency  -     cholecalciferol (VITAMIN D3) (1000 Units /25 mcg) tablet; Take 1 Tablet by mouth daily. Indications: low vitamin D levels    11. GERD without esophagitis  -     omeprazole (PRILOSEC) 20 mg capsule; TAKE 1 CAPSULE DAILY FOR GASTROESOPHAGEAL REFLUX DISEASE    12. Encounter for immunization  -     INFLUENZA, FLUAD, (AGE 65 Y+), IM, PF, 0.5 ML    13.  Nevus  Comments:  referral placed to dermatology to evaluate  Orders:  -     REFERRAL TO DERMATOLOGY              Mehran Abrams NP

## 2022-10-11 LAB
ALBUMIN SERPL-MCNC: 4.8 G/DL (ref 3.7–4.7)
ALBUMIN/CREAT UR: 47 MG/G CREAT (ref 0–29)
ALBUMIN/GLOB SERPL: 2.2 {RATIO} (ref 1.2–2.2)
ALP SERPL-CCNC: 142 IU/L (ref 44–121)
ALT SERPL-CCNC: 14 IU/L (ref 0–32)
AST SERPL-CCNC: 16 IU/L (ref 0–40)
BASOPHILS # BLD AUTO: 0 X10E3/UL (ref 0–0.2)
BASOPHILS NFR BLD AUTO: 0 %
BILIRUB SERPL-MCNC: 0.2 MG/DL (ref 0–1.2)
BUN SERPL-MCNC: 14 MG/DL (ref 8–27)
BUN/CREAT SERPL: 25 (ref 12–28)
CALCIUM SERPL-MCNC: 9.3 MG/DL (ref 8.7–10.3)
CHLORIDE SERPL-SCNC: 100 MMOL/L (ref 96–106)
CHOLEST SERPL-MCNC: 208 MG/DL (ref 100–199)
CO2 SERPL-SCNC: 17 MMOL/L (ref 20–29)
CREAT SERPL-MCNC: 0.57 MG/DL (ref 0.57–1)
CREAT UR-MCNC: 86.2 MG/DL
EGFR: 95 ML/MIN/1.73
EOSINOPHIL # BLD AUTO: 0.3 X10E3/UL (ref 0–0.4)
EOSINOPHIL NFR BLD AUTO: 2 %
ERYTHROCYTE [DISTWIDTH] IN BLOOD BY AUTOMATED COUNT: 13.3 % (ref 11.7–15.4)
EST. AVERAGE GLUCOSE BLD GHB EST-MCNC: 126 MG/DL
GLOBULIN SER CALC-MCNC: 2.2 G/DL (ref 1.5–4.5)
GLUCOSE SERPL-MCNC: 83 MG/DL (ref 70–99)
HBA1C MFR BLD: 6 % (ref 4.8–5.6)
HCT VFR BLD AUTO: 39.1 % (ref 34–46.6)
HDLC SERPL-MCNC: 68 MG/DL
HGB BLD-MCNC: 13 G/DL (ref 11.1–15.9)
IMM GRANULOCYTES # BLD AUTO: 0 X10E3/UL (ref 0–0.1)
IMM GRANULOCYTES NFR BLD AUTO: 0 %
LDLC SERPL CALC-MCNC: 83 MG/DL (ref 0–99)
LYMPHOCYTES # BLD AUTO: 2.5 X10E3/UL (ref 0.7–3.1)
LYMPHOCYTES NFR BLD AUTO: 22 %
MCH RBC QN AUTO: 29.7 PG (ref 26.6–33)
MCHC RBC AUTO-ENTMCNC: 33.2 G/DL (ref 31.5–35.7)
MCV RBC AUTO: 89 FL (ref 79–97)
MICROALBUMIN UR-MCNC: 40.8 UG/ML
MONOCYTES # BLD AUTO: 1 X10E3/UL (ref 0.1–0.9)
MONOCYTES NFR BLD AUTO: 9 %
NEUTROPHILS # BLD AUTO: 7.5 X10E3/UL (ref 1.4–7)
NEUTROPHILS NFR BLD AUTO: 67 %
PLATELET # BLD AUTO: 356 X10E3/UL (ref 150–450)
POTASSIUM SERPL-SCNC: 4 MMOL/L (ref 3.5–5.2)
PROT SERPL-MCNC: 7 G/DL (ref 6–8.5)
RBC # BLD AUTO: 4.38 X10E6/UL (ref 3.77–5.28)
SODIUM SERPL-SCNC: 140 MMOL/L (ref 134–144)
TRIGL SERPL-MCNC: 358 MG/DL (ref 0–149)
VLDLC SERPL CALC-MCNC: 57 MG/DL (ref 5–40)
WBC # BLD AUTO: 11.4 X10E3/UL (ref 3.4–10.8)

## 2022-10-13 NOTE — PROGRESS NOTES
Lab work shows a1c is 6% which is well controlled! Triglycerides are elevated, reducing fried fatty foods in diet and eating more healthy fats like fish, olive oil, nuts, whole grains can help to lower this.

## 2022-10-31 ENCOUNTER — HOSPITAL ENCOUNTER (OUTPATIENT)
Dept: PHYSICAL THERAPY | Age: 77
Discharge: HOME | End: 2022-10-31
Payer: MEDICARE

## 2022-10-31 PROCEDURE — 97161 PT EVAL LOW COMPLEX 20 MIN: CPT | Performed by: PHYSICAL THERAPIST

## 2022-10-31 NOTE — PROGRESS NOTES
W . [address] [place] [address], [address] [facility] [address] [facility]  Phone: [phone]   Fax: [phone]    PT INITIAL EVALUATION NOTE - [facility] 2-15  Plan of Care/Statement of Necessity for Physical Therapy Services  2-15    Patient Name: Sopihe Herrera  Date:10/31/2022  : 1945  [x]  Patient  Verified  Provider#: [id number]  Payor: [facility] / Plan: [facility] HUMAN MEDICARE CHOICE PPO/PFFS / Product Type: Managed Care Medicare /    Referral source: Laura Herrera NP   In time: 310 pm   Out time: 345 pm   Total Treatment Time (min): 35 minutes  Total Timed Codes (min): 0  1:1 Treatment Time ( only): 0   Visit #: 1      Start of Care: 10/31/2022      Onset Date: chronic     Treatment Area/Diagnosis: Pain in right shoulder [M25.511]  Pain in left shoulder [M25.512]    SUBJECTIVE  Pain Level (0-10 scale): 9                Best: 0           Worst:  9, rainy days, lifting handicapped son. Description: achy, extends into the upper arms. Any medication changes, allergies to medications, adverse drug reactions, diagnosis change, or new procedure performed?: [] No    [x] Yes (see summary sheet for update)    Subjective:    Chief compliant of b/l shoulder pain that extends to the upper arm. Left arm dominate. PLOF: no issues  Mechanism of Injury: unknown  Previous Treatment/Compliance: PT, and injections, both helped. PMHx: left hip replacement, DM, Osteoporosis, HTN  Surgical Hx: see list.    Prior Hospitalization: see medical history   Medications: Verified on Patient Summary List  Work Hx: retired. Living Situation: lives with grown sons one of whom is handicapped. Pt Goals: get relief. Barriers: none  Motivation: good  Substance use: none  cognition A & O x 4        OBJECTIVE/EXAMINATION  Ortho:   Observation: OW female.    Posture:  TL scoliosis  Palpation: TTP      Spine:   Cervical ROM:     Flexion:                         [] N/A  []WNL []Minimal  []Moderate  [] Major , 30  Extension:                     [] N/A  []WNL  []Minimal  []Moderate  [] Major  , 35   Rotation to the Right:  [] N/A  []WNL  []Minimal  []Moderate  [x] Major ,30   Rotation to the Left:   [] N/A  []WNL  []Minimal  []Moderate  [x] Major , 30    UE AROM:    Shoulder AROM:  Right       [] N/A  []WNL  []Minimal  [x]Moderate  [] Major , 95 (pain)                       Left         [] N/A  []WNL  []Minimal  [x]Moderate  [] Major , 95  ( pain )     Passive GH ROM : left flexion:  110, abduction:  150, ER: 25                                 Right            110,                   145         35  End feel is capsular. Note:  active IR to side pockets b/l     Accessory Motion:  OA/AA:  severe decrease     Strength:  RUE: supraspinatus: 3/5 , pain  :   40#          LUE:  supraspinatus:  3/5, pain : 35#     Sensation: intact UE. Palpation:  TTP b/l UT, pectoralis minor. With   [] TE   [] TA   [] neuro   [] other: Patient Education: [] Review HEP    [] Progressed/Changed HEP based on:   [] positioning   [] body mechanics   [] transfers   [] heat/ice application    [] other:      Other Objective/Functional Measures:   TUG: WNL    Pain Level (0-10 scale) post treatment: 8    ASSESSMENT/Key Information/Changes in Function:   68 yo female with chief complaint of b/l shoulder and neck pain. Objective findings are suggestive of degenerative changes. Will benefit from skilled PT intervention to mitigate symptoms are improve ability to perform basic ADL. Problem List: pain affecting function, decrease ROM, decrease strength, decrease ADL/ functional abilitiies, and decrease flexibility/ joint mobility    Short Term Goals: To be accomplished in 4 weeks:   1. Inpd with HEP              2.  Reach top of head with one or both hands              3.  Reach back pocket with one or both hands. Long Term Goals: To be accomplished in 8 weeks:   1.   Pain < 5/10 with ADL including lifting a pot off the stove, making bed, sweeping  Patient Goal (s): to have less pain, reach high enough to fix my hair.     Evaluation Complexity History MEDIUM  Complexity : 1-2 comorbidities / personal factors will impact the outcome/ POC ; Examination LOW Complexity : 1-2 Standardized tests and measures addressing body structure, function, activity limitation and / or participation in recreation  ;Presentation LOW Complexity : Stable, uncomplicated  ;Clinical Decision Making Other outcome measures AM-PAC:  63.89%  MEDIUM  Overall Complexity Rating: LOW      Patient / Family readiness to learn indicated by: asking questions  Persons(s) to be included in education: patient (P)  Barriers to Learning/Limitations: None  Patient Self Reported Health Status: good  Rehabilitation Potential: good  Patient/ Caregiver education and instruction: activity modification and exercises      PLAN:  Treatment Plan may include any combination of the following: Therapeutic exercise, Neuromuscular reeducation, Manual therapy, Therapeutic activity, and Electric stim unattended   HEP Issued: n/a    Frequency / Duration: Patient to be seen 2 times per week for 8 weeks. [x]  Plan of care has been reviewed with PTA    Certification Period: 10/31/2022  to  01/31/2022 April SUSAN Green 10/31/2022     ________________________________________________________________________    I certify that the above Therapy Services are being furnished while the patient is under my care. I agree with the treatment plan and certify that this therapy is necessary.     [de-identified] Signature:____________________  Date:____________Time: _________            Nedra Loza NP

## 2022-11-02 ENCOUNTER — APPOINTMENT (OUTPATIENT)
Dept: PHYSICAL THERAPY | Age: 77
End: 2022-11-02

## 2022-11-09 ENCOUNTER — APPOINTMENT (OUTPATIENT)
Dept: PHYSICAL THERAPY | Age: 77
End: 2022-11-09

## 2022-12-27 NOTE — PROGRESS NOTES
[address], [address]  Phone: [phone]   Fax: [phone]    Discharge Summary 2-15    Patient name: Darryl Ghotra  : 1945  Provider#: [id number]  Referral source: Shanta Franks NP      Medical/Treatment Diagnosis: Pain in right shoulder [M25.511]  Pain in left shoulder [M25.512]     Prior Hospitalization: see medical history     Comorbidities: See Plan of Care  Prior Level of Function: See Plan of Care  Medications: Verified on Patient Summary List    Start of Care: 10/31/2022      Onset Date:chronic   Visits from Start of Care: 1     Missed Visits: 0  Reporting Period : 10/31/2022 to 2022    Assessment/Summary of care:  evaluation only    [facility] Functional Measure: 63.39 %/100  Discharge  63.89%/100  Intake      Short Term Goals: To be accomplished in 4 weeks:              1.  Inpd with HEP, NOT MET 2022              2.  Reach top of head with one or both hands , NOT MET 2022              3.  Reach back pocket with one or both hands. NOT MET 2022  Long Term Goals:  To be accomplished in 8 weeks:              1.  Pain < 5/10 with ADL including lifting a pot off the stove, making bed, sweeping  NOT MET 2022  Patient Goal (s): to have less pain, reach high enough to fix my hair  NOT MET 2022      RECOMMENDATIONS:  [x]Discontinue therapy: []Patient has reached or is progressing toward set goals     [x]Patient is non-compliant or has abdicated     []Due to lack of appreciable progress towards set goals     []Other  April Justin-Ashley, PT 2022

## 2024-01-02 DIAGNOSIS — I10 ESSENTIAL (PRIMARY) HYPERTENSION: Primary | ICD-10-CM

## 2024-01-02 DIAGNOSIS — N18.31 TYPE 2 DIABETES MELLITUS WITH STAGE 3A CHRONIC KIDNEY DISEASE, WITHOUT LONG-TERM CURRENT USE OF INSULIN (HCC): ICD-10-CM

## 2024-01-02 DIAGNOSIS — E11.22 TYPE 2 DIABETES MELLITUS WITH STAGE 3A CHRONIC KIDNEY DISEASE, WITHOUT LONG-TERM CURRENT USE OF INSULIN (HCC): ICD-10-CM

## 2024-01-02 DIAGNOSIS — K21.9 GASTRO-ESOPHAGEAL REFLUX DISEASE WITHOUT ESOPHAGITIS: ICD-10-CM

## 2024-01-02 RX ORDER — GLIPIZIDE 2.5 MG/1
2.5 TABLET, EXTENDED RELEASE ORAL DAILY
Qty: 30 TABLET | Refills: 2 | OUTPATIENT
Start: 2024-01-02

## 2024-01-02 RX ORDER — GLIPIZIDE 2.5 MG/1
2.5 TABLET, EXTENDED RELEASE ORAL DAILY
Qty: 90 TABLET | Refills: 0 | Status: SHIPPED | OUTPATIENT
Start: 2024-01-02

## 2024-01-02 RX ORDER — OMEPRAZOLE 20 MG/1
CAPSULE, DELAYED RELEASE ORAL
Qty: 30 CAPSULE | Refills: 2 | OUTPATIENT
Start: 2024-01-02

## 2024-01-02 RX ORDER — LOSARTAN POTASSIUM 50 MG/1
50 TABLET ORAL DAILY
Qty: 30 TABLET | Refills: 2 | OUTPATIENT
Start: 2024-01-02

## 2024-01-02 RX ORDER — OMEPRAZOLE 20 MG/1
CAPSULE, DELAYED RELEASE ORAL
Qty: 90 CAPSULE | Refills: 0 | Status: SHIPPED | OUTPATIENT
Start: 2024-01-02

## 2024-01-02 RX ORDER — LOSARTAN POTASSIUM 50 MG/1
50 TABLET ORAL DAILY
Qty: 90 TABLET | Refills: 0 | Status: SHIPPED | OUTPATIENT
Start: 2024-01-02

## 2024-01-02 NOTE — TELEPHONE ENCOUNTER
Due for apt, we haven't seen her in over a year, please call to schedule.   Can send in small amount of medications to get them to appointment if they need it.  Please just let me know.

## 2024-01-03 ENCOUNTER — TELEPHONE (OUTPATIENT)
Dept: PRIMARY CARE CLINIC | Facility: CLINIC | Age: 79
End: 2024-01-03

## 2024-01-03 NOTE — TELEPHONE ENCOUNTER
----- Message from Consuelo Conway sent at 12/29/2023  4:42 PM EST -----  Subject: Appointment Request    Reason for Call: Established Patient Appointment needed: Routine Medicare   AWV    QUESTIONS    Reason for appointment request? No appointments available during search     Additional Information for Provider? Patient is attempting to schedule an   appointment for her AWV and would like afternoon availabilities. After   searching through March 11, 2024 there were no available appointment at   that point and patient requested a call back with scheduling options   before this date.  ---------------------------------------------------------------------------  --------------  CALL BACK INFO  [phone]; Do not leave any message, patient will call back for answer  ---------------------------------------------------------------------------  --------------  SCRIPT ANSWERS

## 2024-01-03 NOTE — TELEPHONE ENCOUNTER
----- Message from Consuelo Conway sent at 12/29/2023  4:48 PM EST -----  Subject: Appointment Request    Reason for Call: Established Patient Appointment needed: Flu Shot    QUESTIONS    Reason for appointment request? Other - Message stating that [facility] cannot   schedule.      Additional Information for Provider? Patient is inquiring if it was too   late to obtain a flu shot, please call back with inquiry and schedule if   necessary.  ---------------------------------------------------------------------------  --------------  CALL BACK INFO  [phone]; OK to leave message on voicemail  ---------------------------------------------------------------------------  --------------  SCRIPT ANSWERS

## 2024-01-13 ENCOUNTER — HOSPITAL ENCOUNTER (EMERGENCY)
Facility: HOSPITAL | Age: 79
Discharge: HOME | End: 2024-01-13
Attending: EMERGENCY MEDICINE
Payer: MEDICARE

## 2024-01-13 ENCOUNTER — APPOINTMENT (OUTPATIENT)
Facility: HOSPITAL | Age: 79
End: 2024-01-13
Payer: MEDICARE

## 2024-01-13 VITALS
RESPIRATION RATE: 20 BRPM | OXYGEN SATURATION: 96 % | TEMPERATURE: 97.7 F | DIASTOLIC BLOOD PRESSURE: 89 MMHG | BODY MASS INDEX: 27.42 KG/M2 | WEIGHT: 149 LBS | SYSTOLIC BLOOD PRESSURE: 171 MMHG | HEIGHT: 62 IN | HEART RATE: 109 BPM

## 2024-01-13 DIAGNOSIS — S92.325A NONDISPLACED FRACTURE OF SECOND METATARSAL BONE, LEFT FOOT, INITIAL ENCOUNTER FOR CLOSED FRACTURE: Primary | ICD-10-CM

## 2024-01-13 PROCEDURE — 73552 X-RAY EXAM OF FEMUR 2/>: CPT

## 2024-01-13 PROCEDURE — 6370000000 HC RX 637 (ALT 250 FOR IP): Performed by: EMERGENCY MEDICINE

## 2024-01-13 PROCEDURE — 73590 X-RAY EXAM OF LOWER LEG: CPT

## 2024-01-13 PROCEDURE — 73630 X-RAY EXAM OF FOOT: CPT

## 2024-01-13 PROCEDURE — 99283 EMERGENCY DEPT VISIT LOW MDM: CPT

## 2024-01-13 RX ORDER — OXYCODONE HYDROCHLORIDE AND ACETAMINOPHEN 5; 325 MG/1; MG/1
1 TABLET ORAL
Status: COMPLETED | OUTPATIENT
Start: 2024-01-13 | End: 2024-01-13

## 2024-01-13 RX ORDER — OXYCODONE HYDROCHLORIDE AND ACETAMINOPHEN 5; 325 MG/1; MG/1
1 TABLET ORAL EVERY 6 HOURS PRN
Qty: 28 TABLET | Refills: 0 | Status: SHIPPED | OUTPATIENT
Start: 2024-01-13 | End: 2024-01-20

## 2024-01-13 RX ADMIN — OXYCODONE HYDROCHLORIDE AND ACETAMINOPHEN 1 TABLET: 5; 325 TABLET ORAL at 15:07

## 2024-01-13 ASSESSMENT — LIFESTYLE VARIABLES
HOW MANY STANDARD DRINKS CONTAINING ALCOHOL DO YOU HAVE ON A TYPICAL DAY: PATIENT DOES NOT DRINK
HOW OFTEN DO YOU HAVE A DRINK CONTAINING ALCOHOL: NEVER

## 2024-01-13 ASSESSMENT — PAIN SCALES - GENERAL: PAINLEVEL_OUTOF10: 10

## 2024-01-13 ASSESSMENT — PAIN - FUNCTIONAL ASSESSMENT: PAIN_FUNCTIONAL_ASSESSMENT: 0-10

## 2024-01-13 NOTE — DISCHARGE INSTRUCTIONS
have it filled as soon as possible to prevent a delay in treatment. If you have any questions or reservations about taking the medication due to side effects or interactions with other medications, please call your primary care provider or contact the ER.

## 2024-01-13 NOTE — ED PROVIDER NOTES
delayed release capsule TAKE 1 CAPSULE DAILY FOR GASTROESOPHAGEAL REFLUX DISEASE 90 capsule 0    acetaminophen (TYLENOL) 650 MG extended release tablet Take 650 mg by mouth every 8 hours as needed      amLODIPine (NORVASC) 5 MG tablet Take 5 mg by mouth daily      vitamin D (CHOLECALCIFEROL) 25 MCG (1000 UT) TABS tablet Take 1,000 Units by mouth daily      estradiol (ESTRACE) 0.1 MG/GM vaginal cream insert 1 gram vaginally three times a week      ezetimibe (ZETIA) 10 MG tablet Take 1 tablet by mouth daily      meclizine (ANTIVERT) 25 MG tablet take 1 tablet by mouth three times a day if needed for dizziness for up to 10 days      mirtazapine (REMERON) 15 MG tablet Take 1 tablet by mouth nightly      rOPINIRole (REQUIP) 2 MG tablet Take 1 tablet by mouth nightly         Social Determinants of Health:   Social Determinants of Health     Tobacco Use: Low Risk  (10/31/2022)    Patient History     Smoking Tobacco Use: Never     Smokeless Tobacco Use: Never     Passive Exposure: Not on file   Alcohol Use: Not At Risk (1/13/2024)    AUDIT-C     Frequency of Alcohol Consumption: Never     Average Number of Drinks: Patient does not drink     Frequency of Binge Drinking: Never   Financial Resource Strain: Low Risk  (5/23/2022)    Overall Financial Resource Strain (CARDIA)     Difficulty of Paying Living Expenses: Not hard at all   Food Insecurity: No Food Insecurity (5/23/2022)    Hunger Vital Sign     Worried About Running Out of Food in the Last Year: Never true     Ran Out of Food in the Last Year: Never true   Transportation Needs: Not on file   Physical Activity: Not on file   Stress: Not on file   Social Connections: Not on file   Intimate Partner Violence: Not on file   Depression: Not at risk (10/10/2022)    PHQ-2     PHQ-2 Score: 2   Housing Stability: Not on file   Interpersonal Safety: Not on file   Utilities: Not on file       PHYSICAL EXAM   Physical Exam  Physical Exam  Constitutional:       General: No acute

## 2024-01-13 NOTE — ED TRIAGE NOTES
Pt reports she fell while stepping down her step and slipped.  Pt is complaining of left leg pain and has swelling to the thigh.

## 2024-01-19 ENCOUNTER — OFFICE VISIT (OUTPATIENT)
Age: 79
End: 2024-01-19
Payer: OTHER GOVERNMENT

## 2024-01-19 VITALS — WEIGHT: 149 LBS | HEIGHT: 62 IN | BODY MASS INDEX: 27.42 KG/M2

## 2024-01-19 DIAGNOSIS — S92.322A CLOSED DISPLACED FRACTURE OF SECOND METATARSAL BONE OF LEFT FOOT, INITIAL ENCOUNTER: Primary | ICD-10-CM

## 2024-01-19 DIAGNOSIS — S83.422A SPRAIN OF LATERAL COLLATERAL LIGAMENT OF LEFT KNEE, INITIAL ENCOUNTER: ICD-10-CM

## 2024-01-19 PROCEDURE — 99202 OFFICE O/P NEW SF 15 MIN: CPT | Performed by: ORTHOPAEDIC SURGERY

## 2024-01-19 PROCEDURE — 1123F ACP DISCUSS/DSCN MKR DOCD: CPT | Performed by: ORTHOPAEDIC SURGERY

## 2024-01-19 ASSESSMENT — PATIENT HEALTH QUESTIONNAIRE - PHQ9
SUM OF ALL RESPONSES TO PHQ QUESTIONS 1-9: 0
2. FEELING DOWN, DEPRESSED OR HOPELESS: 0
SUM OF ALL RESPONSES TO PHQ QUESTIONS 1-9: 0
SUM OF ALL RESPONSES TO PHQ QUESTIONS 1-9: 0
SUM OF ALL RESPONSES TO PHQ9 QUESTIONS 1 & 2: 0
SUM OF ALL RESPONSES TO PHQ QUESTIONS 1-9: 0
1. LITTLE INTEREST OR PLEASURE IN DOING THINGS: 0

## 2024-01-19 NOTE — PROGRESS NOTES
range of motion of the toes.  Sensory exam is normal.    Assessment:   No diagnosis found.  1. lateral collateral ligament strain left knee    2.  Minimally displaced left second metatarsal head fracture secondary to fall    Plan:     Continue with the boot.  She may discontinue it at nighttime.  May continue using ice as needed.    Patient indicated that she received a Percocet prescription from the emergency department and has been taking it sparingly.  I recommended that she take 2 ibuprofen 2-3 times a day rather than the narcotic.    Follow-up in 3 to 4 weeks for repeat x-rays left foot 3 views.

## 2024-02-08 ENCOUNTER — HOSPITAL ENCOUNTER (OUTPATIENT)
Facility: HOSPITAL | Age: 79
Discharge: HOME | End: 2024-02-08
Payer: MEDICARE

## 2024-02-08 ENCOUNTER — OFFICE VISIT (OUTPATIENT)
Age: 79
End: 2024-02-08
Payer: MEDICARE

## 2024-02-08 DIAGNOSIS — S92.325D CLOSED NONDISPLACED FRACTURE OF SECOND METATARSAL BONE OF LEFT FOOT WITH ROUTINE HEALING, SUBSEQUENT ENCOUNTER: Primary | ICD-10-CM

## 2024-02-08 DIAGNOSIS — S92.325D CLOSED NONDISPLACED FRACTURE OF SECOND METATARSAL BONE OF LEFT FOOT WITH ROUTINE HEALING, SUBSEQUENT ENCOUNTER: ICD-10-CM

## 2024-02-08 PROCEDURE — 73630 X-RAY EXAM OF FOOT: CPT

## 2024-02-08 PROCEDURE — 99212 OFFICE O/P EST SF 10 MIN: CPT | Performed by: ORTHOPAEDIC SURGERY

## 2024-02-08 PROCEDURE — 1123F ACP DISCUSS/DSCN MKR DOCD: CPT | Performed by: ORTHOPAEDIC SURGERY

## 2024-02-08 NOTE — PROGRESS NOTES
shaft area is normal.     Left foot exam reveals now only mild swelling to the dorsal aspect of the left foot associated with tenderness and resolution of bruising along the second metatarsal. There is no tenderness along the great toe, third toe, fourth toe, or fifth toe. There is full range of motion of the toes. Sensory exam is normal.     Assessment:     1. Closed nondisplaced fracture of second metatarsal bone of left foot with routine healing, subsequent encounter      X-rays to be obtained today.    Plan:     Patient will go to radiology today to have the x-rays performed.  I will review them later on and give her a call should there be any problem.    Otherwise, patient will follow-up in 4 weeks for repeat x-rays of the left foot.

## 2024-03-04 ENCOUNTER — HOSPITAL ENCOUNTER (OUTPATIENT)
Facility: HOSPITAL | Age: 79
Discharge: HOME | End: 2024-03-07
Payer: MEDICARE

## 2024-03-04 DIAGNOSIS — S92.325D CLOSED NONDISPLACED FRACTURE OF SECOND METATARSAL BONE OF LEFT FOOT WITH ROUTINE HEALING, SUBSEQUENT ENCOUNTER: ICD-10-CM

## 2024-03-04 PROCEDURE — 73630 X-RAY EXAM OF FOOT: CPT

## 2024-03-20 NOTE — TELEPHONE ENCOUNTER
----- Message from Consuelo Conway sent at 12/29/2023  4:46 PM EST -----  Subject: Refill Request    QUESTIONS  Name of Medication? glipiZIDE (GLUCOTROL XL) 2.5 MG extended release   tablet  Patient-reported dosage and instructions? 2.5 MG / once daily   How many days do you have left? 1  Preferred Pharmacy? [id number]  Pharmacy phone number (if available)? [phone]  ---------------------------------------------------------------------------  --------------,  Name of Medication? losartan (COZAAR) 50 MG tablet  Patient-reported dosage and instructions? 50 MG / once daily  How many days do you have left? 1  Preferred Pharmacy? [id number]  Pharmacy phone number (if available)? [phone]  ---------------------------------------------------------------------------  --------------,  Name of Medication? omeprazole (PRILOSEC) 20 MG delayed release capsule  Patient-reported dosage and instructions? 20 MG / Once daily  How many days do you have left? 7  Preferred Pharmacy? [id number]  Pharmacy phone number (if available)? [phone]  ---------------------------------------------------------------------------  --------------  CALL BACK INFO  What is the best way for the office to contact you? Do not leave any   message, patient will call back for answer  Preferred Call Back Phone Number? [phone]  ---------------------------------------------------------------------------  --------------  SCRIPT ANSWERS  Relationship to Patient? Self   [Risk and Benefits Discussed] : The purpose, risks, discomforts, benefits and alternatives of the procedure have been explained to the patient including no treatment. [Cerumen Impaction] : Cerumen Impaction [Same] : same as the Pre Op Dx. [] : Removal of Cerumen [FreeTextEntry6] : After informed verbal consent is obtained, cerumen is removed from both ear canal after applying peroxide to loosen the cerumen.  With the use of a microscope and suctioning, cerumen was removed.  The patient tolerated the procedure well.

## 2024-04-04 DIAGNOSIS — I10 ESSENTIAL (PRIMARY) HYPERTENSION: ICD-10-CM

## 2024-04-04 DIAGNOSIS — E11.22 TYPE 2 DIABETES MELLITUS WITH STAGE 3A CHRONIC KIDNEY DISEASE, WITHOUT LONG-TERM CURRENT USE OF INSULIN (HCC): ICD-10-CM

## 2024-04-04 DIAGNOSIS — N18.31 TYPE 2 DIABETES MELLITUS WITH STAGE 3A CHRONIC KIDNEY DISEASE, WITHOUT LONG-TERM CURRENT USE OF INSULIN (HCC): ICD-10-CM

## 2024-04-04 DIAGNOSIS — K21.9 GASTRO-ESOPHAGEAL REFLUX DISEASE WITHOUT ESOPHAGITIS: ICD-10-CM

## 2024-04-04 RX ORDER — OMEPRAZOLE 20 MG/1
CAPSULE, DELAYED RELEASE ORAL
Qty: 90 CAPSULE | Refills: 0 | Status: SHIPPED | OUTPATIENT
Start: 2024-04-04

## 2024-04-04 RX ORDER — GLIPIZIDE 2.5 MG/1
2.5 TABLET, EXTENDED RELEASE ORAL DAILY
Qty: 90 TABLET | Refills: 0 | Status: SHIPPED | OUTPATIENT
Start: 2024-04-04

## 2024-04-04 RX ORDER — LOSARTAN POTASSIUM 50 MG/1
50 TABLET ORAL DAILY
Qty: 90 TABLET | Refills: 0 | Status: SHIPPED | OUTPATIENT
Start: 2024-04-04

## 2024-04-10 ENCOUNTER — OFFICE VISIT (OUTPATIENT)
Dept: PRIMARY CARE CLINIC | Facility: CLINIC | Age: 79
End: 2024-04-10
Payer: MEDICARE

## 2024-04-10 VITALS
RESPIRATION RATE: 16 BRPM | HEIGHT: 62 IN | SYSTOLIC BLOOD PRESSURE: 131 MMHG | DIASTOLIC BLOOD PRESSURE: 75 MMHG | HEART RATE: 81 BPM | BODY MASS INDEX: 27.23 KG/M2 | TEMPERATURE: 98.2 F | WEIGHT: 148 LBS | OXYGEN SATURATION: 95 %

## 2024-04-10 DIAGNOSIS — F51.01 PRIMARY INSOMNIA: ICD-10-CM

## 2024-04-10 DIAGNOSIS — G25.81 RESTLESS LEG: ICD-10-CM

## 2024-04-10 DIAGNOSIS — N18.31 TYPE 2 DIABETES MELLITUS WITH STAGE 3A CHRONIC KIDNEY DISEASE, WITHOUT LONG-TERM CURRENT USE OF INSULIN (HCC): ICD-10-CM

## 2024-04-10 DIAGNOSIS — Z78.0 POST-MENOPAUSAL: ICD-10-CM

## 2024-04-10 DIAGNOSIS — I10 ESSENTIAL (PRIMARY) HYPERTENSION: ICD-10-CM

## 2024-04-10 DIAGNOSIS — Z00.00 MEDICARE ANNUAL WELLNESS VISIT, SUBSEQUENT: Primary | ICD-10-CM

## 2024-04-10 DIAGNOSIS — E11.22 TYPE 2 DIABETES MELLITUS WITH STAGE 3A CHRONIC KIDNEY DISEASE, WITHOUT LONG-TERM CURRENT USE OF INSULIN (HCC): ICD-10-CM

## 2024-04-10 DIAGNOSIS — K21.9 GASTRO-ESOPHAGEAL REFLUX DISEASE WITHOUT ESOPHAGITIS: ICD-10-CM

## 2024-04-10 DIAGNOSIS — N95.2 ATROPHIC VAGINITIS: ICD-10-CM

## 2024-04-10 DIAGNOSIS — E55.9 VITAMIN D DEFICIENCY, UNSPECIFIED: ICD-10-CM

## 2024-04-10 PROCEDURE — G0439 PPPS, SUBSEQ VISIT: HCPCS | Performed by: NURSE PRACTITIONER

## 2024-04-10 PROCEDURE — 1123F ACP DISCUSS/DSCN MKR DOCD: CPT | Performed by: NURSE PRACTITIONER

## 2024-04-10 PROCEDURE — 1036F TOBACCO NON-USER: CPT | Performed by: NURSE PRACTITIONER

## 2024-04-10 PROCEDURE — 3078F DIAST BP <80 MM HG: CPT | Performed by: NURSE PRACTITIONER

## 2024-04-10 PROCEDURE — G8400 PT W/DXA NO RESULTS DOC: HCPCS | Performed by: NURSE PRACTITIONER

## 2024-04-10 PROCEDURE — 1090F PRES/ABSN URINE INCON ASSESS: CPT | Performed by: NURSE PRACTITIONER

## 2024-04-10 PROCEDURE — G8427 DOCREV CUR MEDS BY ELIG CLIN: HCPCS | Performed by: NURSE PRACTITIONER

## 2024-04-10 PROCEDURE — G8419 CALC BMI OUT NRM PARAM NOF/U: HCPCS | Performed by: NURSE PRACTITIONER

## 2024-04-10 PROCEDURE — 3075F SYST BP GE 130 - 139MM HG: CPT | Performed by: NURSE PRACTITIONER

## 2024-04-10 PROCEDURE — 99214 OFFICE O/P EST MOD 30 MIN: CPT | Performed by: NURSE PRACTITIONER

## 2024-04-10 RX ORDER — AMLODIPINE BESYLATE 5 MG/1
5 TABLET ORAL DAILY
Qty: 90 TABLET | Refills: 1 | Status: SHIPPED | OUTPATIENT
Start: 2024-04-10

## 2024-04-10 RX ORDER — LOSARTAN POTASSIUM AND HYDROCHLOROTHIAZIDE 12.5; 1 MG/1; MG/1
1 TABLET ORAL DAILY
COMMUNITY
Start: 2022-04-12 | End: 2024-04-10

## 2024-04-10 RX ORDER — LOSARTAN POTASSIUM 50 MG/1
50 TABLET ORAL DAILY
Qty: 90 TABLET | Refills: 1 | Status: SHIPPED | OUTPATIENT
Start: 2024-04-10 | End: 2024-04-10

## 2024-04-10 RX ORDER — AMLODIPINE BESYLATE 5 MG/1
5 TABLET ORAL DAILY
Qty: 90 TABLET | Refills: 1 | Status: SHIPPED | OUTPATIENT
Start: 2024-04-10 | End: 2024-04-10

## 2024-04-10 RX ORDER — ROPINIROLE 2 MG/1
2 TABLET, FILM COATED ORAL NIGHTLY
Qty: 90 TABLET | Refills: 0 | Status: SHIPPED | OUTPATIENT
Start: 2024-04-10

## 2024-04-10 RX ORDER — ESTRADIOL 0.1 MG/G
CREAM VAGINAL
Qty: 42.5 G | Refills: 2 | Status: SHIPPED | OUTPATIENT
Start: 2024-04-10

## 2024-04-10 RX ORDER — GLIPIZIDE 2.5 MG/1
2.5 TABLET, EXTENDED RELEASE ORAL DAILY
Qty: 90 TABLET | Refills: 1 | Status: SHIPPED | OUTPATIENT
Start: 2024-04-10 | End: 2024-04-10

## 2024-04-10 RX ORDER — MIRTAZAPINE 7.5 MG/1
7.5 TABLET, FILM COATED ORAL NIGHTLY
Qty: 90 TABLET | Refills: 1 | Status: SHIPPED | OUTPATIENT
Start: 2024-04-10 | End: 2024-04-10

## 2024-04-10 RX ORDER — LOSARTAN POTASSIUM 50 MG/1
50 TABLET ORAL DAILY
Qty: 90 TABLET | Refills: 1 | Status: SHIPPED | OUTPATIENT
Start: 2024-04-10

## 2024-04-10 RX ORDER — MIRTAZAPINE 7.5 MG/1
7.5 TABLET, FILM COATED ORAL NIGHTLY
Qty: 90 TABLET | Refills: 1 | Status: SHIPPED | OUTPATIENT
Start: 2024-04-10

## 2024-04-10 RX ORDER — ESTRADIOL 0.1 MG/G
CREAM VAGINAL
Qty: 42.5 G | Refills: 2 | Status: SHIPPED | OUTPATIENT
Start: 2024-04-10 | End: 2024-04-10

## 2024-04-10 RX ORDER — OMEPRAZOLE 20 MG/1
CAPSULE, DELAYED RELEASE ORAL
Qty: 90 CAPSULE | Refills: 1 | Status: SHIPPED | OUTPATIENT
Start: 2024-04-10 | End: 2024-04-10

## 2024-04-10 RX ORDER — EZETIMIBE 10 MG/1
10 TABLET ORAL DAILY
Qty: 90 TABLET | Refills: 1 | Status: SHIPPED | OUTPATIENT
Start: 2024-04-10 | End: 2024-04-10

## 2024-04-10 RX ORDER — OMEPRAZOLE 20 MG/1
CAPSULE, DELAYED RELEASE ORAL
Qty: 90 CAPSULE | Refills: 1 | Status: SHIPPED | OUTPATIENT
Start: 2024-04-10

## 2024-04-10 RX ORDER — ROPINIROLE 2 MG/1
2 TABLET, FILM COATED ORAL NIGHTLY
Qty: 90 TABLET | Refills: 0 | Status: SHIPPED | OUTPATIENT
Start: 2024-04-10 | End: 2024-04-10

## 2024-04-10 RX ORDER — GLIPIZIDE 2.5 MG/1
2.5 TABLET, EXTENDED RELEASE ORAL DAILY
Qty: 90 TABLET | Refills: 1 | Status: SHIPPED | OUTPATIENT
Start: 2024-04-10

## 2024-04-10 RX ORDER — EZETIMIBE 10 MG/1
10 TABLET ORAL DAILY
Qty: 90 TABLET | Refills: 1 | Status: SHIPPED | OUTPATIENT
Start: 2024-04-10

## 2024-04-10 SDOH — ECONOMIC STABILITY: HOUSING INSECURITY
IN THE LAST 12 MONTHS, WAS THERE A TIME WHEN YOU DID NOT HAVE A STEADY PLACE TO SLEEP OR SLEPT IN A SHELTER (INCLUDING NOW)?: NO

## 2024-04-10 SDOH — ECONOMIC STABILITY: FOOD INSECURITY: WITHIN THE PAST 12 MONTHS, YOU WORRIED THAT YOUR FOOD WOULD RUN OUT BEFORE YOU GOT MONEY TO BUY MORE.: NEVER TRUE

## 2024-04-10 SDOH — ECONOMIC STABILITY: FOOD INSECURITY: WITHIN THE PAST 12 MONTHS, THE FOOD YOU BOUGHT JUST DIDN'T LAST AND YOU DIDN'T HAVE MONEY TO GET MORE.: NEVER TRUE

## 2024-04-10 SDOH — ECONOMIC STABILITY: INCOME INSECURITY: HOW HARD IS IT FOR YOU TO PAY FOR THE VERY BASICS LIKE FOOD, HOUSING, MEDICAL CARE, AND HEATING?: NOT HARD AT ALL

## 2024-04-10 ASSESSMENT — PATIENT HEALTH QUESTIONNAIRE - PHQ9
10. IF YOU CHECKED OFF ANY PROBLEMS, HOW DIFFICULT HAVE THESE PROBLEMS MADE IT FOR YOU TO DO YOUR WORK, TAKE CARE OF THINGS AT HOME, OR GET ALONG WITH OTHER PEOPLE: NOT DIFFICULT AT ALL
SUM OF ALL RESPONSES TO PHQ QUESTIONS 1-9: 0
3. TROUBLE FALLING OR STAYING ASLEEP: NOT AT ALL
SUM OF ALL RESPONSES TO PHQ9 QUESTIONS 1 & 2: 0
4. FEELING TIRED OR HAVING LITTLE ENERGY: NOT AT ALL
8. MOVING OR SPEAKING SO SLOWLY THAT OTHER PEOPLE COULD HAVE NOTICED. OR THE OPPOSITE, BEING SO FIGETY OR RESTLESS THAT YOU HAVE BEEN MOVING AROUND A LOT MORE THAN USUAL: NOT AT ALL
5. POOR APPETITE OR OVEREATING: NOT AT ALL
6. FEELING BAD ABOUT YOURSELF - OR THAT YOU ARE A FAILURE OR HAVE LET YOURSELF OR YOUR FAMILY DOWN: NOT AT ALL
SUM OF ALL RESPONSES TO PHQ QUESTIONS 1-9: 0
1. LITTLE INTEREST OR PLEASURE IN DOING THINGS: NOT AT ALL
2. FEELING DOWN, DEPRESSED OR HOPELESS: NOT AT ALL
SUM OF ALL RESPONSES TO PHQ QUESTIONS 1-9: 0
SUM OF ALL RESPONSES TO PHQ QUESTIONS 1-9: 0
9. THOUGHTS THAT YOU WOULD BE BETTER OFF DEAD, OR OF HURTING YOURSELF: NOT AT ALL
7. TROUBLE CONCENTRATING ON THINGS, SUCH AS READING THE NEWSPAPER OR WATCHING TELEVISION: NOT AT ALL

## 2024-04-10 ASSESSMENT — LIFESTYLE VARIABLES: HOW OFTEN DO YOU HAVE A DRINK CONTAINING ALCOHOL: MONTHLY OR LESS

## 2024-04-10 NOTE — PATIENT INSTRUCTIONS
with added sugar. These include candy, desserts, and soda pop.   Heart-healthy lifestyle    If your doctor recommends it, get more exercise. For many people, walking is a good choice. Or you may want to swim, bike, or do other activities. Bit by bit, increase the time you're active every day. Try for at least 30 minutes on most days of the week.     Try to quit or cut back on using tobacco and other nicotine products. This includes smoking and vaping. If you need help quitting, talk to your doctor about stop-smoking programs and medicines. These can increase your chances of quitting for good. Quitting is one of the most important things you can do to protect your heart. It is never too late to quit. Try to avoid secondhand smoke too.     Stay at a weight that's healthy for you. Talk to your doctor if you need help losing weight.     Try to get 7 to 9 hours of sleep each night.     Limit alcohol to 2 drinks a day for men and 1 drink a day for women. Too much alcohol can cause health problems.     Manage other health problems such as diabetes, high blood pressure, and high cholesterol. If you think you may have a problem with alcohol or drug use, talk to your doctor.   Medicines    Take your medicines exactly as prescribed. Call your doctor if you think you are having a problem with your medicine.     If your doctor recommends aspirin, take the amount directed each day. Make sure you take aspirin and not another kind of pain reliever, such as acetaminophen (Tylenol).   When should you call for help?   Call 911 if you have symptoms of a heart attack. These may include:    Chest pain or pressure, or a strange feeling in the chest.     Sweating.     Shortness of breath.     Pain, pressure, or a strange feeling in the back, neck, jaw, or upper belly or in one or both shoulders or arms.     Lightheadedness or sudden weakness.     A fast or irregular heartbeat.   After you call 911, the  may tell you to chew 1

## 2024-04-10 NOTE — PROGRESS NOTES
Chief Complaint   Patient presents with    Medicare AWV     /75 (Site: Right Upper Arm, Position: Sitting)   Pulse 81   Temp 98.2 °F (36.8 °C) (Oral)   Resp 16   Ht 1.575 m (5' 2\")   Wt 67.1 kg (148 lb)   SpO2 95%   BMI 27.07 kg/m²     \"Have you been to the ER, urgent care clinic since your last visit?  Hospitalized since your last visit?\"    YES - When: approximately 1 months ago.  Where and Why: Urgent Care for bad cough.    “Have you seen or consulted any other health care providers outside of [facility] since your last visit?”    YES - When: approximately 1 months ago.  Where and Why: Urgent Care for bad cough.            Click Here for Release of Records Request

## 2024-04-10 NOTE — PROGRESS NOTES
Medicare Annual Wellness Visit    Rain Florian is here for Medicare AWV    Assessment & Plan   Medicare annual wellness visit, subsequent  Essential (primary) hypertension  Comments:  stable on current medications  Orders:  -     amLODIPine (NORVASC) 5 MG tablet; Take 1 tablet by mouth daily, Disp-90 tablet, R-1Normal  -     losartan (COZAAR) 50 MG tablet; Take 1 tablet by mouth daily, Disp-90 tablet, R-1Normal  Type 2 diabetes mellitus with stage 3a chronic kidney disease, without long-term current use of insulin (HCC)  Comments:  due for labs and eye exam.   will check labs today  Orders:  -     glipiZIDE (GLUCOTROL XL) 2.5 MG extended release tablet; Take 1 tablet by mouth daily, Disp-90 tablet, R-1Normal  -     ezetimibe (ZETIA) 10 MG tablet; Take 1 tablet by mouth daily, Disp-90 tablet, R-1Normal  -     CBC  -     Comprehensive Metabolic Panel  -     Hemoglobin A1C  -     Lipid Panel  -     TSH + Free T4 Panel  -     Iron and TIBC  Gastro-esophageal reflux disease without esophagitis  Comments:  stable on omeprazole  Orders:  -     omeprazole (PRILOSEC) 20 MG delayed release capsule; take 1 capsule by mouth daily for GASTROESOPHAGEAL REFLUX DISEASE, Disp-90 capsule, R-1Normal  Restless leg  Comments:  using requip on days it is needed.  will check iron levels.  Orders:  -     rOPINIRole (REQUIP) 2 MG tablet; Take 1 tablet by mouth nightly, Disp-90 tablet, R-0Normal  -     Iron and TIBC  Primary insomnia  Comments:  will reduce remeron to 7.5 mg for sleep to reduce drowsiness the next day.  Orders:  -     mirtazapine (REMERON) 7.5 MG tablet; Take 1 tablet by mouth nightly, Disp-90 tablet, R-1Normal  Vitamin D deficiency, unspecified  -     vitamin D (CHOLECALCIFEROL) 25 MCG (1000 UT) TABS tablet; Take 1 tablet by mouth daily, Disp-90 tablet, R-1Normal  -     Vitamin D 25 Hydroxy  Atrophic vaginitis  -     estradiol (ESTRACE) 0.1 MG/GM vaginal cream; insert 1 gram vaginally three times a week, Disp-42.5 g,

## 2024-04-11 LAB
25(OH)D3+25(OH)D2 SERPL-MCNC: 18.2 NG/ML (ref 30–100)
ALBUMIN SERPL-MCNC: 4.3 G/DL (ref 3.8–4.8)
ALBUMIN/GLOB SERPL: 1.7 {RATIO} (ref 1.2–2.2)
ALP SERPL-CCNC: 123 IU/L (ref 44–121)
ALT SERPL-CCNC: 11 IU/L (ref 0–32)
AST SERPL-CCNC: 15 IU/L (ref 0–40)
BILIRUB SERPL-MCNC: 0.5 MG/DL (ref 0–1.2)
BUN SERPL-MCNC: 13 MG/DL (ref 8–27)
BUN/CREAT SERPL: 24 (ref 12–28)
CALCIUM SERPL-MCNC: 9.3 MG/DL (ref 8.7–10.3)
CHLORIDE SERPL-SCNC: 105 MMOL/L (ref 96–106)
CHOLEST SERPL-MCNC: 204 MG/DL (ref 100–199)
CO2 SERPL-SCNC: 25 MMOL/L (ref 20–29)
CREAT SERPL-MCNC: 0.54 MG/DL (ref 0.57–1)
EGFRCR SERPLBLD CKD-EPI 2021: 95 ML/MIN/1.73
ERYTHROCYTE [DISTWIDTH] IN BLOOD BY AUTOMATED COUNT: 14.7 % (ref 11.7–15.4)
GLOBULIN SER CALC-MCNC: 2.5 G/DL (ref 1.5–4.5)
GLUCOSE SERPL-MCNC: 91 MG/DL (ref 70–99)
HBA1C MFR BLD: 6 % (ref 4.8–5.6)
HCT VFR BLD AUTO: 34.6 % (ref 34–46.6)
HDLC SERPL-MCNC: 75 MG/DL
HGB BLD-MCNC: 11.6 G/DL (ref 11.1–15.9)
IRON SATN MFR SERPL: 21 % (ref 15–55)
IRON SERPL-MCNC: 68 UG/DL (ref 27–139)
LDLC SERPL CALC-MCNC: 112 MG/DL (ref 0–99)
MCH RBC QN AUTO: 29.1 PG (ref 26.6–33)
MCHC RBC AUTO-ENTMCNC: 33.5 G/DL (ref 31.5–35.7)
MCV RBC AUTO: 87 FL (ref 79–97)
PLATELET # BLD AUTO: 305 X10E3/UL (ref 150–450)
POTASSIUM SERPL-SCNC: 4.1 MMOL/L (ref 3.5–5.2)
PROT SERPL-MCNC: 6.8 G/DL (ref 6–8.5)
RBC # BLD AUTO: 3.99 X10E6/UL (ref 3.77–5.28)
SODIUM SERPL-SCNC: 144 MMOL/L (ref 134–144)
T4 FREE SERPL-MCNC: 1.14 NG/DL (ref 0.82–1.77)
TIBC SERPL-MCNC: 326 UG/DL (ref 250–450)
TRIGL SERPL-MCNC: 97 MG/DL (ref 0–149)
TSH SERPL DL<=0.005 MIU/L-ACNC: 1.69 UIU/ML (ref 0.45–4.5)
UIBC SERPL-MCNC: 258 UG/DL (ref 118–369)
VLDLC SERPL CALC-MCNC: 17 MG/DL (ref 5–40)
WBC # BLD AUTO: 7.6 X10E3/UL (ref 3.4–10.8)

## 2024-04-11 RX ORDER — ERGOCALCIFEROL 1.25 MG/1
50000 CAPSULE ORAL WEEKLY
Qty: 12 CAPSULE | Refills: 0 | Status: SHIPPED | OUTPATIENT
Start: 2024-04-11 | End: 2024-07-04

## 2024-05-06 ENCOUNTER — OFFICE VISIT (OUTPATIENT)
Age: 79
End: 2024-05-06
Payer: MEDICARE

## 2024-05-06 VITALS
WEIGHT: 148 LBS | SYSTOLIC BLOOD PRESSURE: 113 MMHG | TEMPERATURE: 97.8 F | HEART RATE: 93 BPM | HEIGHT: 62 IN | OXYGEN SATURATION: 90 % | BODY MASS INDEX: 27.23 KG/M2 | DIASTOLIC BLOOD PRESSURE: 74 MMHG | RESPIRATION RATE: 16 BRPM

## 2024-05-06 DIAGNOSIS — S92.325D CLOSED NONDISPLACED FRACTURE OF SECOND METATARSAL BONE OF LEFT FOOT WITH ROUTINE HEALING, SUBSEQUENT ENCOUNTER: Primary | ICD-10-CM

## 2024-05-06 DIAGNOSIS — S83.422A SPRAIN OF LATERAL COLLATERAL LIGAMENT OF LEFT KNEE, INITIAL ENCOUNTER: ICD-10-CM

## 2024-05-06 PROCEDURE — 3078F DIAST BP <80 MM HG: CPT | Performed by: ORTHOPAEDIC SURGERY

## 2024-05-06 PROCEDURE — 1123F ACP DISCUSS/DSCN MKR DOCD: CPT | Performed by: ORTHOPAEDIC SURGERY

## 2024-05-06 PROCEDURE — 99212 OFFICE O/P EST SF 10 MIN: CPT | Performed by: ORTHOPAEDIC SURGERY

## 2024-05-06 PROCEDURE — 3074F SYST BP LT 130 MM HG: CPT | Performed by: ORTHOPAEDIC SURGERY

## 2024-05-06 ASSESSMENT — PATIENT HEALTH QUESTIONNAIRE - PHQ9
SUM OF ALL RESPONSES TO PHQ QUESTIONS 1-9: 0
SUM OF ALL RESPONSES TO PHQ QUESTIONS 1-9: 0
SUM OF ALL RESPONSES TO PHQ9 QUESTIONS 1 & 2: 0
1. LITTLE INTEREST OR PLEASURE IN DOING THINGS: NOT AT ALL
SUM OF ALL RESPONSES TO PHQ QUESTIONS 1-9: 0
SUM OF ALL RESPONSES TO PHQ QUESTIONS 1-9: 0
2. FEELING DOWN, DEPRESSED OR HOPELESS: NOT AT ALL

## 2024-05-06 NOTE — PROGRESS NOTES
Identified pt with two pt identifiers (name and ). Reviewed chart in preparation for visit and have obtained necessary documentation.    Rain Florian is a 78 y.o. female  Chief Complaint   Patient presents with    Follow-up     Lft foot FX     /74 (Site: Right Upper Arm, Position: Sitting, Cuff Size: Medium Adult)   Pulse 93   Temp 97.8 °F (36.6 °C) (Oral)   Resp 16   Ht 1.575 m (5' 2\")   Wt 67.1 kg (148 lb)   SpO2 90%   BMI 27.07 kg/m²     1. Have you been to the ER, urgent care clinic since your last visit?  Hospitalized since your last visit?no    2. Have you seen or consulted any other health care providers outside of the [facility] since your last visit?  Include any pap smears or colon screening. no  
exam is normal.  Motor exam is entirely normal.    Evaluation left knee shows no swelling effusion or any other abnormality with full range of motion.  No specific areas of tenderness today along the LCL.  Assessment:     1. Closed nondisplaced fracture of second metatarsal bone of left foot with routine healing, subsequent encounter    2. Sprain of lateral collateral ligament of left knee, initial encounter      X-rays as noted above that was shared with the patient today.    Plan:     The patient is released to full unrestricted activities and will follow-up as needed.

## 2024-06-26 DIAGNOSIS — E55.9 VITAMIN D DEFICIENCY, UNSPECIFIED: ICD-10-CM

## 2024-06-26 RX ORDER — ERGOCALCIFEROL 1.25 MG/1
50000 CAPSULE ORAL WEEKLY
Qty: 12 CAPSULE | Refills: 0 | Status: SHIPPED | OUTPATIENT
Start: 2024-06-26

## 2024-07-02 DIAGNOSIS — G25.81 RESTLESS LEG: ICD-10-CM

## 2024-07-02 RX ORDER — ROPINIROLE 2 MG/1
2 TABLET, FILM COATED ORAL NIGHTLY
Qty: 90 TABLET | Refills: 0 | Status: SHIPPED | OUTPATIENT
Start: 2024-07-02

## 2024-09-15 DIAGNOSIS — E55.9 VITAMIN D DEFICIENCY, UNSPECIFIED: ICD-10-CM

## 2024-09-16 RX ORDER — ERGOCALCIFEROL 1.25 MG/1
50000 CAPSULE, LIQUID FILLED ORAL WEEKLY
Qty: 12 CAPSULE | Refills: 0 | Status: SHIPPED | OUTPATIENT
Start: 2024-09-16

## 2024-10-03 DIAGNOSIS — G25.81 RESTLESS LEG: ICD-10-CM

## 2024-10-03 DIAGNOSIS — N18.31 TYPE 2 DIABETES MELLITUS WITH STAGE 3A CHRONIC KIDNEY DISEASE, WITHOUT LONG-TERM CURRENT USE OF INSULIN (HCC): ICD-10-CM

## 2024-10-03 DIAGNOSIS — E11.22 TYPE 2 DIABETES MELLITUS WITH STAGE 3A CHRONIC KIDNEY DISEASE, WITHOUT LONG-TERM CURRENT USE OF INSULIN (HCC): ICD-10-CM

## 2024-10-03 DIAGNOSIS — F51.01 PRIMARY INSOMNIA: ICD-10-CM

## 2024-10-03 DIAGNOSIS — I10 ESSENTIAL (PRIMARY) HYPERTENSION: ICD-10-CM

## 2024-10-03 RX ORDER — MIRTAZAPINE 7.5 MG/1
7.5 TABLET, FILM COATED ORAL NIGHTLY
Qty: 90 TABLET | Refills: 1 | Status: SHIPPED | OUTPATIENT
Start: 2024-10-03

## 2024-10-03 RX ORDER — EZETIMIBE 10 MG/1
10 TABLET ORAL DAILY
Qty: 90 TABLET | Refills: 1 | Status: SHIPPED | OUTPATIENT
Start: 2024-10-03

## 2024-10-03 RX ORDER — AMLODIPINE BESYLATE 5 MG/1
5 TABLET ORAL DAILY
Qty: 90 TABLET | Refills: 1 | Status: SHIPPED | OUTPATIENT
Start: 2024-10-03

## 2024-10-03 RX ORDER — ROPINIROLE 2 MG/1
2 TABLET, FILM COATED ORAL NIGHTLY
Qty: 90 TABLET | Refills: 0 | Status: SHIPPED | OUTPATIENT
Start: 2024-10-03

## 2024-10-14 ENCOUNTER — OFFICE VISIT (OUTPATIENT)
Dept: PRIMARY CARE CLINIC | Facility: CLINIC | Age: 79
End: 2024-10-14
Payer: MEDICARE

## 2024-10-14 VITALS
SYSTOLIC BLOOD PRESSURE: 127 MMHG | BODY MASS INDEX: 26.87 KG/M2 | WEIGHT: 146 LBS | TEMPERATURE: 98.3 F | HEIGHT: 62 IN | HEART RATE: 83 BPM | RESPIRATION RATE: 16 BRPM | DIASTOLIC BLOOD PRESSURE: 65 MMHG | OXYGEN SATURATION: 95 %

## 2024-10-14 DIAGNOSIS — F32.2 MAJOR DEPRESSIVE DISORDER, SINGLE EPISODE, SEVERE WITHOUT PSYCHOTIC FEATURES (HCC): ICD-10-CM

## 2024-10-14 DIAGNOSIS — F51.01 PRIMARY INSOMNIA: Primary | ICD-10-CM

## 2024-10-14 DIAGNOSIS — E11.22 TYPE 2 DIABETES MELLITUS WITH STAGE 3A CHRONIC KIDNEY DISEASE, WITHOUT LONG-TERM CURRENT USE OF INSULIN (HCC): ICD-10-CM

## 2024-10-14 DIAGNOSIS — K21.9 GASTRO-ESOPHAGEAL REFLUX DISEASE WITHOUT ESOPHAGITIS: ICD-10-CM

## 2024-10-14 DIAGNOSIS — Z23 IMMUNIZATION DUE: ICD-10-CM

## 2024-10-14 DIAGNOSIS — N18.31 TYPE 2 DIABETES MELLITUS WITH STAGE 3A CHRONIC KIDNEY DISEASE, WITHOUT LONG-TERM CURRENT USE OF INSULIN (HCC): ICD-10-CM

## 2024-10-14 DIAGNOSIS — I10 ESSENTIAL (PRIMARY) HYPERTENSION: ICD-10-CM

## 2024-10-14 DIAGNOSIS — E55.9 VITAMIN D DEFICIENCY, UNSPECIFIED: ICD-10-CM

## 2024-10-14 DIAGNOSIS — B37.9 YEAST INFECTION: ICD-10-CM

## 2024-10-14 PROBLEM — Z96.0 VAGINAL PESSARY PRESENT: Status: RESOLVED | Noted: 2022-05-26 | Resolved: 2024-10-14

## 2024-10-14 PROBLEM — F22 PARANOID (HCC): Status: RESOLVED | Noted: 2021-12-02 | Resolved: 2024-10-14

## 2024-10-14 PROBLEM — N17.0 ATN (ACUTE TUBULAR NECROSIS) (HCC): Status: RESOLVED | Noted: 2022-05-24 | Resolved: 2024-10-14

## 2024-10-14 PROBLEM — N17.9 AKI (ACUTE KIDNEY INJURY) (HCC): Status: RESOLVED | Noted: 2022-05-24 | Resolved: 2024-10-14

## 2024-10-14 PROCEDURE — G8400 PT W/DXA NO RESULTS DOC: HCPCS | Performed by: NURSE PRACTITIONER

## 2024-10-14 PROCEDURE — 3044F HG A1C LEVEL LT 7.0%: CPT | Performed by: NURSE PRACTITIONER

## 2024-10-14 PROCEDURE — G0008 ADMIN INFLUENZA VIRUS VAC: HCPCS | Performed by: NURSE PRACTITIONER

## 2024-10-14 PROCEDURE — 1090F PRES/ABSN URINE INCON ASSESS: CPT | Performed by: NURSE PRACTITIONER

## 2024-10-14 PROCEDURE — G8427 DOCREV CUR MEDS BY ELIG CLIN: HCPCS | Performed by: NURSE PRACTITIONER

## 2024-10-14 PROCEDURE — G8482 FLU IMMUNIZE ORDER/ADMIN: HCPCS | Performed by: NURSE PRACTITIONER

## 2024-10-14 PROCEDURE — 3074F SYST BP LT 130 MM HG: CPT | Performed by: NURSE PRACTITIONER

## 2024-10-14 PROCEDURE — 3078F DIAST BP <80 MM HG: CPT | Performed by: NURSE PRACTITIONER

## 2024-10-14 PROCEDURE — G8419 CALC BMI OUT NRM PARAM NOF/U: HCPCS | Performed by: NURSE PRACTITIONER

## 2024-10-14 PROCEDURE — 99214 OFFICE O/P EST MOD 30 MIN: CPT | Performed by: NURSE PRACTITIONER

## 2024-10-14 PROCEDURE — 90653 IIV ADJUVANT VACCINE IM: CPT | Performed by: NURSE PRACTITIONER

## 2024-10-14 PROCEDURE — 1123F ACP DISCUSS/DSCN MKR DOCD: CPT | Performed by: NURSE PRACTITIONER

## 2024-10-14 PROCEDURE — 1036F TOBACCO NON-USER: CPT | Performed by: NURSE PRACTITIONER

## 2024-10-14 RX ORDER — GLIPIZIDE 2.5 MG/1
2.5 TABLET, EXTENDED RELEASE ORAL DAILY
Qty: 90 TABLET | Refills: 1 | Status: SHIPPED | OUTPATIENT
Start: 2024-10-14

## 2024-10-14 RX ORDER — TRAZODONE HYDROCHLORIDE 50 MG/1
50 TABLET, FILM COATED ORAL NIGHTLY PRN
Qty: 30 TABLET | Refills: 0 | Status: SHIPPED | OUTPATIENT
Start: 2024-10-14

## 2024-10-14 RX ORDER — LOSARTAN POTASSIUM 50 MG/1
50 TABLET ORAL DAILY
Qty: 90 TABLET | Refills: 1 | Status: SHIPPED | OUTPATIENT
Start: 2024-10-14

## 2024-10-14 RX ORDER — EZETIMIBE 10 MG/1
10 TABLET ORAL DAILY
Qty: 90 TABLET | Refills: 1 | Status: SHIPPED | OUTPATIENT
Start: 2024-10-14

## 2024-10-14 RX ORDER — AMLODIPINE BESYLATE 5 MG/1
5 TABLET ORAL DAILY
Qty: 90 TABLET | Refills: 1 | Status: SHIPPED | OUTPATIENT
Start: 2024-10-14

## 2024-10-14 SDOH — ECONOMIC STABILITY: FOOD INSECURITY: WITHIN THE PAST 12 MONTHS, YOU WORRIED THAT YOUR FOOD WOULD RUN OUT BEFORE YOU GOT MONEY TO BUY MORE.: NEVER TRUE

## 2024-10-14 SDOH — ECONOMIC STABILITY: FOOD INSECURITY: WITHIN THE PAST 12 MONTHS, THE FOOD YOU BOUGHT JUST DIDN'T LAST AND YOU DIDN'T HAVE MONEY TO GET MORE.: NEVER TRUE

## 2024-10-14 SDOH — ECONOMIC STABILITY: INCOME INSECURITY: HOW HARD IS IT FOR YOU TO PAY FOR THE VERY BASICS LIKE FOOD, HOUSING, MEDICAL CARE, AND HEATING?: NOT HARD AT ALL

## 2024-10-14 ASSESSMENT — PATIENT HEALTH QUESTIONNAIRE - PHQ9
SUM OF ALL RESPONSES TO PHQ QUESTIONS 1-9: 0
SUM OF ALL RESPONSES TO PHQ QUESTIONS 1-9: 0
2. FEELING DOWN, DEPRESSED OR HOPELESS: NOT AT ALL
SUM OF ALL RESPONSES TO PHQ QUESTIONS 1-9: 0
SUM OF ALL RESPONSES TO PHQ QUESTIONS 1-9: 0
SUM OF ALL RESPONSES TO PHQ9 QUESTIONS 1 & 2: 0
1. LITTLE INTEREST OR PLEASURE IN DOING THINGS: NOT AT ALL

## 2024-10-14 ASSESSMENT — ENCOUNTER SYMPTOMS
CHEST TIGHTNESS: 0
SHORTNESS OF BREATH: 0

## 2024-10-14 NOTE — PROGRESS NOTES
Chief Complaint   Patient presents with    6 Month Follow-Up    Vaginitis     Symptoms for about 2 weeks     /65 (Site: Right Upper Arm, Position: Sitting)   Pulse 83   Temp 98.3 °F (36.8 °C) (Oral)   Resp 16   Ht 1.575 m (5' 2\")   Wt 66.2 kg (146 lb)   SpO2 95%   BMI 26.70 kg/m²     \"Have you been to the ER, urgent care clinic since your last visit?  Hospitalized since your last visit?\"    NO    “Have you seen or consulted any other health care providers outside our system since your last visit?”    NO

## 2024-10-14 NOTE — PROGRESS NOTES
Rain Florian is a 78 y.o. female who was seen in clinic today (10/14/2024).    Assessment & Plan:   Below is the assessment and plan developed based on review of pertinent history, physical exam, labs, studies, and medications.    1. Primary insomnia  -     traZODone (DESYREL) 50 MG tablet; Take 1 tablet by mouth nightly as needed for Sleep, Disp-30 tablet, R-0Normal  2. Major depressive disorder, single episode, severe without psychotic features (HCC)  Assessment & Plan:   Chronic, at goal (stable), continue current treatment plan  3. Essential (primary) hypertension  Comments:  stable on current medications  Orders:  -     amLODIPine (NORVASC) 5 MG tablet; Take 1 tablet by mouth daily, Disp-90 tablet, R-1Normal  -     losartan (COZAAR) 50 MG tablet; Take 1 tablet by mouth daily, Disp-90 tablet, R-1Normal  4. Type 2 diabetes mellitus with stage 3a chronic kidney disease, without long-term current use of insulin ([facility])  Comments:  due for labs and eye exam.   will check labs today  Orders:  -     ezetimibe (ZETIA) 10 MG tablet; Take 1 tablet by mouth daily, Disp-90 tablet, R-1Normal  -     glipiZIDE (GLUCOTROL XL) 2.5 MG extended release tablet; Take 1 tablet by mouth daily, Disp-90 tablet, R-1Normal  -     Comprehensive Metabolic Panel  -     CBC  -     Lipid Panel  -     Hemoglobin A1C  5. Gastro-esophageal reflux disease without esophagitis  Comments:  stable on omeprazole  Orders:  -     omeprazole (PRILOSEC) 20 MG delayed release capsule; take 1 capsule by mouth daily for GASTROESOPHAGEAL REFLUX DISEASE, Disp-90 capsule, R-1Normal  6. Vitamin D deficiency, unspecified  Comments:  will monitor labs.  Orders:  -     vitamin D (CHOLECALCIFEROL) 25 MCG (1000 UT) TABS tablet; Take 1 tablet by mouth daily, Disp-90 tablet, R-1Normal  -     Vitamin D 25 Hydroxy  7. Yeast infection  Comments:  she was not aware of refills on the fluconazole.   patient declines to have pelvic exam/testing today.  She will follow up if

## 2024-10-15 LAB
25(OH)D3+25(OH)D2 SERPL-MCNC: 41 NG/ML (ref 30–100)
ALBUMIN SERPL-MCNC: 4.3 G/DL (ref 3.8–4.8)
ALP SERPL-CCNC: 113 IU/L (ref 44–121)
ALT SERPL-CCNC: 13 IU/L (ref 0–32)
AST SERPL-CCNC: 18 IU/L (ref 0–40)
BILIRUB SERPL-MCNC: 0.4 MG/DL (ref 0–1.2)
BUN SERPL-MCNC: 13 MG/DL (ref 8–27)
BUN/CREAT SERPL: 23 (ref 12–28)
CALCIUM SERPL-MCNC: 9.2 MG/DL (ref 8.7–10.3)
CHLORIDE SERPL-SCNC: 104 MMOL/L (ref 96–106)
CHOLEST SERPL-MCNC: 186 MG/DL (ref 100–199)
CO2 SERPL-SCNC: 23 MMOL/L (ref 20–29)
CREAT SERPL-MCNC: 0.56 MG/DL (ref 0.57–1)
EGFRCR SERPLBLD CKD-EPI 2021: 94 ML/MIN/1.73
ERYTHROCYTE [DISTWIDTH] IN BLOOD BY AUTOMATED COUNT: 13.8 % (ref 11.7–15.4)
GLOBULIN SER CALC-MCNC: 2.3 G/DL (ref 1.5–4.5)
GLUCOSE SERPL-MCNC: 75 MG/DL (ref 70–99)
HBA1C MFR BLD: 5.9 % (ref 4.8–5.6)
HCT VFR BLD AUTO: 34.5 % (ref 34–46.6)
HDLC SERPL-MCNC: 79 MG/DL
HGB BLD-MCNC: 10.9 G/DL (ref 11.1–15.9)
LDLC SERPL CALC-MCNC: 89 MG/DL (ref 0–99)
MCH RBC QN AUTO: 29.2 PG (ref 26.6–33)
MCHC RBC AUTO-ENTMCNC: 31.6 G/DL (ref 31.5–35.7)
MCV RBC AUTO: 93 FL (ref 79–97)
PLATELET # BLD AUTO: 321 X10E3/UL (ref 150–450)
POTASSIUM SERPL-SCNC: 4.2 MMOL/L (ref 3.5–5.2)
PROT SERPL-MCNC: 6.6 G/DL (ref 6–8.5)
RBC # BLD AUTO: 3.73 X10E6/UL (ref 3.77–5.28)
SODIUM SERPL-SCNC: 141 MMOL/L (ref 134–144)
TRIGL SERPL-MCNC: 102 MG/DL (ref 0–149)
VLDLC SERPL CALC-MCNC: 18 MG/DL (ref 5–40)
WBC # BLD AUTO: 12.7 X10E3/UL (ref 3.4–10.8)

## 2024-10-17 DIAGNOSIS — E11.22 TYPE 2 DIABETES MELLITUS WITH STAGE 3A CHRONIC KIDNEY DISEASE, WITHOUT LONG-TERM CURRENT USE OF INSULIN (HCC): ICD-10-CM

## 2024-10-17 DIAGNOSIS — N18.31 TYPE 2 DIABETES MELLITUS WITH STAGE 3A CHRONIC KIDNEY DISEASE, WITHOUT LONG-TERM CURRENT USE OF INSULIN (HCC): ICD-10-CM

## 2024-10-18 RX ORDER — EZETIMIBE 10 MG/1
10 TABLET ORAL DAILY
Qty: 90 TABLET | Refills: 1 | OUTPATIENT
Start: 2024-10-18

## 2024-11-08 DIAGNOSIS — F51.01 PRIMARY INSOMNIA: ICD-10-CM

## 2024-11-08 RX ORDER — TRAZODONE HYDROCHLORIDE 50 MG/1
50 TABLET, FILM COATED ORAL NIGHTLY PRN
Qty: 30 TABLET | Refills: 0 | Status: SHIPPED | OUTPATIENT
Start: 2024-11-08

## 2025-02-18 RX ORDER — ERGOCALCIFEROL 1.25 MG/1
CAPSULE, LIQUID FILLED ORAL
Refills: 0 | OUTPATIENT
Start: 2025-02-18

## 2025-03-07 ENCOUNTER — OFFICE VISIT (OUTPATIENT)
Age: 80
End: 2025-03-07

## 2025-03-07 DIAGNOSIS — G89.29 CHRONIC RIGHT SHOULDER PAIN: ICD-10-CM

## 2025-03-07 DIAGNOSIS — M25.512 BILATERAL SHOULDER PAIN, UNSPECIFIED CHRONICITY: ICD-10-CM

## 2025-03-07 DIAGNOSIS — M19.112 POST-TRAUMATIC OSTEOARTHRITIS OF LEFT SHOULDER: Primary | ICD-10-CM

## 2025-03-07 DIAGNOSIS — M25.511 CHRONIC RIGHT SHOULDER PAIN: ICD-10-CM

## 2025-03-07 DIAGNOSIS — G89.29 CHRONIC LEFT SHOULDER PAIN: ICD-10-CM

## 2025-03-07 DIAGNOSIS — M19.111 POST-TRAUMATIC OSTEOARTHRITIS, RIGHT SHOULDER: ICD-10-CM

## 2025-03-07 DIAGNOSIS — M12.812 ROTATOR CUFF TEAR ARTHROPATHY OF LEFT SHOULDER: ICD-10-CM

## 2025-03-07 DIAGNOSIS — M25.512 CHRONIC LEFT SHOULDER PAIN: ICD-10-CM

## 2025-03-07 DIAGNOSIS — M12.811 ROTATOR CUFF ARTHROPATHY OF RIGHT SHOULDER: ICD-10-CM

## 2025-03-07 DIAGNOSIS — M75.102 ROTATOR CUFF TEAR ARTHROPATHY OF LEFT SHOULDER: ICD-10-CM

## 2025-03-07 DIAGNOSIS — M25.511 BILATERAL SHOULDER PAIN, UNSPECIFIED CHRONICITY: ICD-10-CM

## 2025-03-07 RX ORDER — TRIAMCINOLONE ACETONIDE 40 MG/ML
40 INJECTION, SUSPENSION INTRA-ARTICULAR; INTRAMUSCULAR ONCE
Status: COMPLETED | OUTPATIENT
Start: 2025-03-07 | End: 2025-03-07

## 2025-03-07 RX ORDER — DICLOFENAC SODIUM 75 MG/1
75 TABLET, DELAYED RELEASE ORAL DAILY
Qty: 30 TABLET | Refills: 3 | Status: SHIPPED | OUTPATIENT
Start: 2025-03-07

## 2025-03-07 RX ADMIN — TRIAMCINOLONE ACETONIDE 40 MG: 40 INJECTION, SUSPENSION INTRA-ARTICULAR; INTRAMUSCULAR at 12:20

## 2025-03-07 ASSESSMENT — PATIENT HEALTH QUESTIONNAIRE - PHQ9
SUM OF ALL RESPONSES TO PHQ QUESTIONS 1-9: 0
SUM OF ALL RESPONSES TO PHQ QUESTIONS 1-9: 0
1. LITTLE INTEREST OR PLEASURE IN DOING THINGS: NOT AT ALL
SUM OF ALL RESPONSES TO PHQ QUESTIONS 1-9: 0
SUM OF ALL RESPONSES TO PHQ QUESTIONS 1-9: 0
2. FEELING DOWN, DEPRESSED OR HOPELESS: NOT AT ALL

## 2025-03-07 NOTE — PROGRESS NOTES
Identified pt with two pt identifiers (name and ). Reviewed chart in preparation for visit and have obtained necessary documentation.     Rain Florian is a 79 y.o. female Shoulder Injury (Pt Hurt both shoulders when she fell down , her left side is worse then the right , pt states she went to have her mammogram and they informed her she has a lump on her left shoulder close to he neck)  .           1. Have you been to the ER, urgent care clinic since your last visit?  Hospitalized since your last visit?  no    2. Have you seen or consulted any other health care providers outside of the [facility] since your last visit?  Include any pap smears or colon screening.  no       
Systems  HEENT: Denies any cold or flu symptoms or upper respiratory infections.  Cardiopulmonary: Denies any chest pain or shortness of breath.  GI: Denies any abdominal pain.  Musculoskeletal: Positive for bilateral shoulder pain, crepitus, loss of motion, stiffness, weakness.  Neurological: Denies any rating pain down the arms, paresthesias, or any weakness in the hands.  Objective:   Ortho Exam    Evaluation of the left shoulder shows significant crepitus to any range of motion.  She can externally rotate only 20 degrees.  Forward flexion is about 90 degrees.  Internal rotation is only along the hip.    Evaluation of the right shoulder is very similar except that she can elevate about 100 degrees.    Neurological exam both upper extremities is normal.  Assessment:     1. Post-traumatic osteoarthritis of left shoulder    2. Rotator cuff tear arthropathy of left shoulder    3. Chronic left shoulder pain    4. Post-traumatic osteoarthritis, right shoulder    5. Rotator cuff arthropathy of right shoulder    6. Chronic right shoulder pain    7. Bilateral shoulder pain, unspecified chronicity      Radiographically results as noted above are shared with the patient today.    Plan:     I recommended steroid injections into both shoulders since she has never had this before.  Will start with the left and in 3 to 4 weeks we will inject the right shoulder due to her diabetes mellitus.    After verbal and written obtained, the left shoulder is the appropriate injection site.  I prepped the left posterior lateral shoulder with Betadine, chlorhexidine, and alcohol and injected 10 mL 1% lidocaine followed by mixture of 4 mL of 0.75% Marcaine and 1 mL Kenalog.  No complications and no bleeding.  A Band-Aid was placed.    Patient will follow-up in 3 to 4 weeks for injection of the right shoulder.      We will start diclofenac 75 mg daily and this was called into her pharmacy today.  All questions were answered.

## 2025-04-28 ENCOUNTER — TELEPHONE (OUTPATIENT)
Dept: PRIMARY CARE CLINIC | Facility: CLINIC | Age: 80
End: 2025-04-28

## 2025-04-28 NOTE — TELEPHONE ENCOUNTER
----- Message from Ernesto CALDWELL sent at 4/28/2025  3:47 PM EDT -----  Regarding: [facility] Appointment Request  [facility] Appointment Request    Patient needs appointment for [facility] Appointment Type: Existing Condition Follow Up.    Patient Requested Dates(s): next week ( wanted to reschedule  Patient Requested Time: 11: 00Pm  Provider Name: Iman Kemp APRN - NP      Reason for Appointment Request: Established Patient - No appointments available during search  --------------------------------------------------------------------------------------------------------------------------    Relationship to Patient: Self     Call Back Information: OK to leave message on voicemail  Preferred Call Back Number: Phone  [phone]

## 2025-05-09 ENCOUNTER — TELEPHONE (OUTPATIENT)
Dept: PRIMARY CARE CLINIC | Facility: CLINIC | Age: 80
End: 2025-05-09

## 2025-05-09 DIAGNOSIS — K21.9 GASTRO-ESOPHAGEAL REFLUX DISEASE WITHOUT ESOPHAGITIS: ICD-10-CM

## 2025-05-09 NOTE — TELEPHONE ENCOUNTER
Pt requested refill on prilosec meds, but had not been seen since November 2024.     Pt is scheduled for appointment in July, would it be possible to send meds to hold over until appointment?

## 2025-05-12 RX ORDER — OMEPRAZOLE 20 MG/1
CAPSULE, DELAYED RELEASE ORAL
Qty: 90 CAPSULE | Refills: 1 | Status: SHIPPED | OUTPATIENT
Start: 2025-05-12

## 2025-05-12 NOTE — TELEPHONE ENCOUNTER
I sent in a prescription on the following med(s):    Requested Prescriptions     Signed Prescriptions Disp Refills    omeprazole (PRILOSEC) 20 MG delayed release capsule 90 capsule 1     Sig: take 1 capsule by mouth daily for GASTROESOPHAGEAL REFLUX DISEASE     Authorizing Provider: TOYA MARCELINO        To the below pharmacy:    [id number] - [place], [place] - [address] [phone] - F [phone] [address] [address]  Phone: [phone] Fax: [phone]       Let me know if you need anything else.

## 2025-06-17 ENCOUNTER — OFFICE VISIT (OUTPATIENT)
Age: 80
End: 2025-06-17
Payer: MEDICARE

## 2025-06-17 DIAGNOSIS — M25.512 CHRONIC LEFT SHOULDER PAIN: ICD-10-CM

## 2025-06-17 DIAGNOSIS — M75.102 ROTATOR CUFF TEAR ARTHROPATHY OF LEFT SHOULDER: ICD-10-CM

## 2025-06-17 DIAGNOSIS — M19.111 POST-TRAUMATIC OSTEOARTHRITIS, RIGHT SHOULDER: Primary | ICD-10-CM

## 2025-06-17 DIAGNOSIS — G89.29 CHRONIC LEFT SHOULDER PAIN: ICD-10-CM

## 2025-06-17 DIAGNOSIS — M19.112 POST-TRAUMATIC OSTEOARTHRITIS OF LEFT SHOULDER: ICD-10-CM

## 2025-06-17 DIAGNOSIS — G89.29 CHRONIC RIGHT SHOULDER PAIN: ICD-10-CM

## 2025-06-17 DIAGNOSIS — M12.811 ROTATOR CUFF ARTHROPATHY OF RIGHT SHOULDER: ICD-10-CM

## 2025-06-17 DIAGNOSIS — M12.812 ROTATOR CUFF TEAR ARTHROPATHY OF LEFT SHOULDER: ICD-10-CM

## 2025-06-17 DIAGNOSIS — M25.511 CHRONIC RIGHT SHOULDER PAIN: ICD-10-CM

## 2025-06-17 PROCEDURE — 99214 OFFICE O/P EST MOD 30 MIN: CPT | Performed by: ORTHOPAEDIC SURGERY

## 2025-06-17 PROCEDURE — 20610 DRAIN/INJ JOINT/BURSA W/O US: CPT | Performed by: ORTHOPAEDIC SURGERY

## 2025-06-17 PROCEDURE — G8400 PT W/DXA NO RESULTS DOC: HCPCS | Performed by: ORTHOPAEDIC SURGERY

## 2025-06-17 PROCEDURE — G8419 CALC BMI OUT NRM PARAM NOF/U: HCPCS | Performed by: ORTHOPAEDIC SURGERY

## 2025-06-17 PROCEDURE — 1123F ACP DISCUSS/DSCN MKR DOCD: CPT | Performed by: ORTHOPAEDIC SURGERY

## 2025-06-17 PROCEDURE — 1090F PRES/ABSN URINE INCON ASSESS: CPT | Performed by: ORTHOPAEDIC SURGERY

## 2025-06-17 PROCEDURE — 1036F TOBACCO NON-USER: CPT | Performed by: ORTHOPAEDIC SURGERY

## 2025-06-17 PROCEDURE — G8427 DOCREV CUR MEDS BY ELIG CLIN: HCPCS | Performed by: ORTHOPAEDIC SURGERY

## 2025-06-17 RX ORDER — TRIAMCINOLONE ACETONIDE 40 MG/ML
40 INJECTION, SUSPENSION INTRA-ARTICULAR; INTRAMUSCULAR ONCE
Status: COMPLETED | OUTPATIENT
Start: 2025-06-17 | End: 2025-06-17

## 2025-06-17 RX ADMIN — TRIAMCINOLONE ACETONIDE 40 MG: 40 INJECTION, SUSPENSION INTRA-ARTICULAR; INTRAMUSCULAR at 14:42

## 2025-06-17 NOTE — PROGRESS NOTES
Subjective:      Patient ID: Rain Florian is a 79 y.o.  female.    Chief Complaint   Patient presents with    Follow-up     Right shoulder pain wants injection      Patient return in follow-up of the severe bilateral shoulder osteoarthritis for which he underwent a left shoulder subacromial Kenalog injection on 307 2025.  She states that this injection provided significant prompt relief of nearly all the pain in her left shoulder.  She would like an injection into the right shoulder today that we had previously discussed.  No other changes.      HISTORY: The patient is a 79-year-old former patient of ours, who presented on 3/07/2025 with a 3 to 4-year history of chronic bilateral shoulder pain for which she has never had any evaluation.  Several years ago she sustained a fall on both shoulders and noticed that she had lost some motion in her shoulders and noticed \"weakness\".  Over the past year she has had increasing degrees of stiffness in both shoulders and her left shoulder has been worse than the right.  She is right-hand dominant.  She has also noticed crepitus in her shoulders and popping sounds whenever she moves her shoulders.  She can no longer forward elevate her shoulders.  X-rays of both shoulders taken today show severe degenerative end-stage osteoarthritis with bone-on-bone contact and evidence of rotator cuff arthropathies in both shoulders.  There are very large osteophytes associated with the arthritis which is now bone-on-bone.  Patient states that she has never had any x-rays prior to today.  Pain does not radiate down the arms and is a localized only to the shoulders.  Patient states that she has been taking BC powders on a regular basis which has been hurting her stomach some but seems to help reduce her pain.  In addition, she has used heating pads on her shoulders which helps.     Social History     Occupational History    Not on file   Tobacco Use    Smoking status: Never

## 2025-06-27 DIAGNOSIS — I10 ESSENTIAL (PRIMARY) HYPERTENSION: ICD-10-CM

## 2025-06-27 RX ORDER — LOSARTAN POTASSIUM 50 MG/1
50 TABLET ORAL DAILY
Qty: 90 TABLET | Refills: 1 | Status: SHIPPED | OUTPATIENT
Start: 2025-06-27

## 2025-07-29 ENCOUNTER — TELEPHONE (OUTPATIENT)
Dept: PRIMARY CARE CLINIC | Facility: CLINIC | Age: 80
End: 2025-07-29

## 2025-07-29 DIAGNOSIS — E11.22 TYPE 2 DIABETES MELLITUS WITH STAGE 3A CHRONIC KIDNEY DISEASE, WITHOUT LONG-TERM CURRENT USE OF INSULIN (HCC): ICD-10-CM

## 2025-07-29 DIAGNOSIS — N18.31 TYPE 2 DIABETES MELLITUS WITH STAGE 3A CHRONIC KIDNEY DISEASE, WITHOUT LONG-TERM CURRENT USE OF INSULIN (HCC): ICD-10-CM

## 2025-07-29 RX ORDER — GLIPIZIDE 2.5 MG/1
2.5 TABLET, EXTENDED RELEASE ORAL DAILY
Qty: 90 TABLET | Refills: 1 | Status: SHIPPED | OUTPATIENT
Start: 2025-07-29 | End: 2025-07-30 | Stop reason: SDUPTHER

## 2025-07-29 NOTE — TELEPHONE ENCOUNTER
Patient needs a rx refill on rx glipizide 2.5 mg tab [facility] in [place] [address]

## 2025-07-29 NOTE — TELEPHONE ENCOUNTER
Rain Florian is requesting a refill on glipizide .   Please send medication to: [facility] [address] on file  Patient's last appointment was 10/14/2024   Next visit is scheduled for 8/25/2025

## 2025-07-30 RX ORDER — GLIPIZIDE 2.5 MG/1
2.5 TABLET, EXTENDED RELEASE ORAL DAILY
Qty: 90 TABLET | Refills: 1 | Status: SHIPPED | OUTPATIENT
Start: 2025-07-30